# Patient Record
Sex: FEMALE | Race: WHITE | NOT HISPANIC OR LATINO | Employment: PART TIME | URBAN - METROPOLITAN AREA
[De-identification: names, ages, dates, MRNs, and addresses within clinical notes are randomized per-mention and may not be internally consistent; named-entity substitution may affect disease eponyms.]

---
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
## 2021-12-01 ENCOUNTER — ANNUAL EXAM (OUTPATIENT)
Dept: OBGYN CLINIC | Facility: CLINIC | Age: 21
End: 2021-12-01
Payer: COMMERCIAL

## 2021-12-01 VITALS
BODY MASS INDEX: 22.2 KG/M2 | WEIGHT: 130 LBS | DIASTOLIC BLOOD PRESSURE: 70 MMHG | SYSTOLIC BLOOD PRESSURE: 112 MMHG | HEIGHT: 64 IN

## 2021-12-01 DIAGNOSIS — Z30.011 ENCOUNTER FOR INITIAL PRESCRIPTION OF CONTRACEPTIVE PILLS: ICD-10-CM

## 2021-12-01 DIAGNOSIS — Z11.3 SCREENING EXAMINATION FOR VENEREAL DISEASE: ICD-10-CM

## 2021-12-01 DIAGNOSIS — Z01.419 WOMEN'S ANNUAL ROUTINE GYNECOLOGICAL EXAMINATION: Primary | ICD-10-CM

## 2021-12-01 DIAGNOSIS — Z83.49 FAMILY HISTORY OF GRAVES' DISEASE: ICD-10-CM

## 2021-12-01 DIAGNOSIS — Z11.3 ENCOUNTER FOR SPECIAL SCREENING EXAMINATION FOR INFECTION WITH PREDOMINANTLY SEXUAL MODE OF TRANSMISSION: ICD-10-CM

## 2021-12-01 PROCEDURE — 99395 PREV VISIT EST AGE 18-39: CPT | Performed by: OBSTETRICS & GYNECOLOGY

## 2021-12-01 PROCEDURE — 87491 CHLMYD TRACH DNA AMP PROBE: CPT | Performed by: OBSTETRICS & GYNECOLOGY

## 2021-12-01 PROCEDURE — 0503F POSTPARTUM CARE VISIT: CPT | Performed by: OBSTETRICS & GYNECOLOGY

## 2021-12-01 PROCEDURE — 87591 N.GONORRHOEAE DNA AMP PROB: CPT | Performed by: OBSTETRICS & GYNECOLOGY

## 2021-12-01 RX ORDER — NORETHINDRONE ACETATE AND ETHINYL ESTRADIOL 1MG-20(21)
1 KIT ORAL DAILY
Qty: 84 TABLET | Refills: 3 | Status: SHIPPED | OUTPATIENT
Start: 2021-12-01

## 2021-12-03 ENCOUNTER — APPOINTMENT (OUTPATIENT)
Dept: LAB | Facility: MEDICAL CENTER | Age: 21
End: 2021-12-03
Payer: COMMERCIAL

## 2021-12-03 DIAGNOSIS — Z83.49 FAMILY HISTORY OF GRAVES' DISEASE: ICD-10-CM

## 2021-12-03 LAB
C TRACH DNA SPEC QL NAA+PROBE: NEGATIVE
N GONORRHOEA DNA SPEC QL NAA+PROBE: NEGATIVE
T4 FREE SERPL-MCNC: 1.11 NG/DL (ref 0.78–1.33)
TSH SERPL DL<=0.05 MIU/L-ACNC: 0.42 UIU/ML (ref 0.46–3.98)

## 2021-12-03 PROCEDURE — 84439 ASSAY OF FREE THYROXINE: CPT

## 2021-12-03 PROCEDURE — 36415 COLL VENOUS BLD VENIPUNCTURE: CPT

## 2021-12-03 PROCEDURE — 84443 ASSAY THYROID STIM HORMONE: CPT

## 2022-01-05 ENCOUNTER — HOSPITAL ENCOUNTER (EMERGENCY)
Facility: HOSPITAL | Age: 22
Discharge: HOME | End: 2022-01-05
Attending: EMERGENCY MEDICINE | Admitting: EMERGENCY MEDICINE
Payer: COMMERCIAL

## 2022-01-05 ENCOUNTER — APPOINTMENT (EMERGENCY)
Dept: RADIOLOGY | Facility: HOSPITAL | Age: 22
End: 2022-01-05
Payer: COMMERCIAL

## 2022-01-05 VITALS
HEART RATE: 83 BPM | OXYGEN SATURATION: 98 % | DIASTOLIC BLOOD PRESSURE: 90 MMHG | RESPIRATION RATE: 20 BRPM | TEMPERATURE: 97.8 F | SYSTOLIC BLOOD PRESSURE: 145 MMHG

## 2022-01-05 DIAGNOSIS — R68.89 FLU-LIKE SYMPTOMS: Primary | ICD-10-CM

## 2022-01-05 DIAGNOSIS — Z20.822 SUSPECTED COVID-19 VIRUS INFECTION: ICD-10-CM

## 2022-01-05 PROCEDURE — 99283 EMERGENCY DEPT VISIT LOW MDM: CPT

## 2022-01-05 PROCEDURE — 71046 X-RAY EXAM CHEST 2 VIEWS: CPT

## 2022-01-05 PROCEDURE — 99284 EMERGENCY DEPT VISIT MOD MDM: CPT | Performed by: SURGERY

## 2022-01-05 PROCEDURE — 87636 SARSCOV2 & INF A&B AMP PRB: CPT | Performed by: SURGERY

## 2022-01-05 RX ORDER — AZITHROMYCIN 250 MG/1
TABLET, FILM COATED ORAL
Qty: 6 TABLET | Refills: 0 | Status: SHIPPED | OUTPATIENT
Start: 2022-01-05 | End: 2022-01-09

## 2022-01-05 RX ORDER — ALBUTEROL SULFATE 90 UG/1
2 AEROSOL, METERED RESPIRATORY (INHALATION) ONCE
Status: COMPLETED | OUTPATIENT
Start: 2022-01-05 | End: 2022-01-05

## 2022-01-05 RX ADMIN — ALBUTEROL SULFATE 2 PUFF: 90 AEROSOL, METERED RESPIRATORY (INHALATION) at 05:42

## 2022-01-05 NOTE — Clinical Note
Ashley Carias was seen and treated in our emergency department on 1/5/2022  No work    Diagnosis: Suspected Cristiano Lawrence  may return to work on return date  She may return on this date: 01/12/2022         If you have any questions or concerns, please don't hesitate to call        Wilfrid Robbins PA-C    ______________________________           _______________          _______________  Hospital Representative                              Date                                Time

## 2022-01-05 NOTE — DISCHARGE INSTRUCTIONS
Please return to the ED if you begin to experience any new or worsening symptoms, chest pain, shortness of breath, lightheadedness, dizziness, changes to vision, syncopal episodes, numbness, tingling, or weakness in the extremities, difficulty walking/swallowing/breathing

## 2022-01-09 LAB
FLUAV RNA RESP QL NAA+PROBE: NEGATIVE
FLUBV RNA RESP QL NAA+PROBE: NEGATIVE
SARS-COV-2 RNA RESP QL NAA+PROBE: NEGATIVE

## 2022-01-14 ENCOUNTER — OFFICE VISIT (OUTPATIENT)
Dept: URGENT CARE | Facility: MEDICAL CENTER | Age: 22
End: 2022-01-14
Payer: COMMERCIAL

## 2022-01-14 VITALS
TEMPERATURE: 98.4 F | WEIGHT: 121 LBS | OXYGEN SATURATION: 97 % | BODY MASS INDEX: 20.66 KG/M2 | HEIGHT: 64 IN | HEART RATE: 94 BPM | SYSTOLIC BLOOD PRESSURE: 147 MMHG | DIASTOLIC BLOOD PRESSURE: 75 MMHG | RESPIRATION RATE: 18 BRPM

## 2022-01-14 DIAGNOSIS — I47.9 TACHYCARDIA, PAROXYSMAL (HCC): Primary | ICD-10-CM

## 2022-01-14 PROCEDURE — 99213 OFFICE O/P EST LOW 20 MIN: CPT | Performed by: PHYSICIAN ASSISTANT

## 2022-01-14 PROCEDURE — 93005 ELECTROCARDIOGRAM TRACING: CPT | Performed by: PHYSICIAN ASSISTANT

## 2022-01-14 RX ORDER — METHYLPREDNISOLONE 4 MG/1
TABLET ORAL
COMMUNITY
Start: 2022-01-10 | End: 2022-04-15

## 2022-01-14 RX ORDER — AZITHROMYCIN 250 MG/1
TABLET, FILM COATED ORAL
COMMUNITY
Start: 2022-01-10 | End: 2022-04-15

## 2022-01-14 NOTE — PATIENT INSTRUCTIONS
1  Increase her oral hydration  2  Get better rest     3   Go to the ER for any worsening symptoms  4  Follow-up with your PCP next week

## 2022-01-14 NOTE — ED PROVIDER NOTES
HPI: Patient is a 22 y o  female who presents with 4-5 days of fever, chills, cough and myalgias which the patient describes at mild The patient has had contact with people with similar symptoms  The patient taken OTC medication with relief of symptoms  No Known Allergies    History reviewed  No pertinent past medical history  History reviewed  No pertinent surgical history  Social History     Tobacco Use    Smoking status: Former Smoker     Types: Cigarettes    Smokeless tobacco: Never Used    Tobacco comment: quit 2-3 years   Vaping Use    Vaping Use: Every day    Substances: Nicotine, Flavoring   Substance Use Topics    Alcohol use: Not Currently     Comment: rarely    Drug use: Yes     Types: Marijuana       Nursing notes reviewed  Physical Exam:  ED Triage Vitals [01/05/22 0522]   Temperature Pulse Respirations Blood Pressure SpO2   97 8 °F (36 6 °C) 83 20 145/90 98 %      Temp Source Heart Rate Source Patient Position - Orthostatic VS BP Location FiO2 (%)   Temporal Monitor Sitting Left arm --      Pain Score       --           ROS: Positive for chills, myalgias, fever, sore throat, headache the remainder of a 10 organ system ROS was otherwise unremarkable  General: awake, alert, no acute distress    Head: normocephalic, atraumatic    Eyes: no scleral icterus  Ears: external ears normal, hearing grossly intact  Nose: external exam grossly normal, positive nasal discharge  Neck: symmetric, No JVD noted, trachea midline  Pulmonary: no respiratory distress, no tachypnea noted  Cardiovascular: appears well perfused  Abdomen: no distention noted  Musculoskeletal: no deformities noted, tone normal  Neuro: grossly non-focal  Psych: mood and affect appropriate    The patient is stable and has a history and physical exam consistent with a viral illness  COVID19 testing has been performed  I considered the patient's other medical conditions as applicable/noted above in my medical decision making    The patient is stable upon discharge  The plan is for supportive care at home  The patient (and any family present) verbalized understanding of the discharge instructions and warnings that would necessitate return to the Emergency Department  All questions were answered prior to discharge  Medications   albuterol (PROVENTIL HFA,VENTOLIN HFA) inhaler 2 puff (2 puffs Inhalation Given 1/5/22 0542)     Final diagnoses:   Flu-like symptoms   Suspected COVID-19 virus infection     Time reflects when diagnosis was documented in both MDM as applicable and the Disposition within this note     Time User Action Codes Description Comment    1/5/2022  6:13 AM Dollene Cashing Add [R68 89] Flu-like symptoms     1/5/2022  6:13 AM Dollene Cashing Add [Z20 822] Suspected COVID-19 virus infection       ED Disposition     ED Disposition Condition Date/Time Comment    Discharge Stable Wed Jan 5, 2022  6:15 AM Aldo Ledesma discharge to home/self care  Follow-up Information     Follow up With Specialties Details Why Contact Info Additional [facility] Emergency Department Emergency Medicine Go to  If symptoms worsen [address] Emergency Department, [address] [place] [address], [facility] Niya Hernandez MD Pediatrics Schedule an appointment as soon as possible for a visit   [address] [place] [address] 16  [phone]           Discharge Medication List as of 1/5/2022  6:15 AM      CONTINUE these medications which have NOT CHANGED    Details   norethindrone-ethinyl estradiol (Junel FE 1/20) 1-20 MG-MCG per tablet Take 1 tablet by mouth daily, Starting Wed 12/1/2021, Normal           No discharge procedures on file      Electronically Signed by       Luis Hammond PA-C  01/14/22 9282

## 2022-01-14 NOTE — PROGRESS NOTES
[address] [facility]        NAME: Merrill Cordoba is a 22 y o  female  : 2000    MRN: [id number]  DATE: 2022  TIME: 1:22 PM    Assessment and Plan   Tachycardia, paroxysmal ([address] ) [I47 9]  1  Tachycardia, paroxysmal [facility])           Patient Instructions       Follow up with PCP in 3-5 days  Proceed to  ER if symptoms worsen  Chief Complaint     Chief Complaint   Patient presents with    Rapid Heart Rate     pt noticed that her heart rate has been elevated since monday  pt stated even with minimal activity   seen by pcp and they told her it could have been from congestion   placed on z pac and abx  pt stated that heart rate was above 100  History of Present Illness       52-year-old female with a 2 to three-day complaint of intermittent tachycardia  She denies irregularity in her heart rate that she can detect  She denies chest pain shortness of breath  She states that her sleep has been somewhat disrupted  She also states that symptoms seemed to begin after she began prednisone for bronchitis prescribed by her PcP this past Monday  She is currently asymptomatic  No syncope no lightheadedness no vertigo  She denies heavy caffeine intake  Rapid Heart Rate   Pertinent negatives include no chest pain, coughing, fever, shortness of breath or vomiting  Review of Systems   Review of Systems   Constitutional: Negative for chills, fatigue and fever  HENT: Negative for ear pain and sore throat  Eyes: Negative for pain and visual disturbance  Respiratory: Negative for cough, chest tightness and shortness of breath  Cardiovascular: Positive for palpitations  Negative for chest pain  Gastrointestinal: Negative for abdominal pain and vomiting  Genitourinary: Negative for dysuria and hematuria  Musculoskeletal: Negative for arthralgias and back pain  Skin: Negative for color change and rash  Neurological: Negative for seizures and syncope     All other systems reviewed and are negative  Current Medications       Current Outpatient Medications:     azithromycin (ZITHROMAX) 250 mg tablet, , Disp: , Rfl:     methylPREDNISolone 4 MG tablet therapy pack, , Disp: , Rfl:     norethindrone-ethinyl estradiol (Junel FE 1/20) 1-20 MG-MCG per tablet, Take 1 tablet by mouth daily, Disp: 84 tablet, Rfl: 3    Current Allergies     Allergies as of 01/14/2022    (No Known Allergies)            The following portions of the patient's history were reviewed and updated as appropriate: allergies, current medications, past family history, past medical history, past social history, past surgical history and problem list      No past medical history on file  No past surgical history on file  Family History   Problem Relation Age of Onset   Yocasta Dunnings' disease Mother     Hypertension Father          Medications have been verified  Objective   /75   Pulse 94   Temp 98 4 °F (36 9 °C)   Resp 18   Ht 5' 4" (1 626 m)   Wt 54 9 kg (121 lb)   SpO2 97%   BMI 20 77 kg/m²        Physical Exam     Physical Exam  Constitutional:       Appearance: Normal appearance  HENT:      Head: Normocephalic  Nose: Nose normal    Eyes:      Conjunctiva/sclera: Conjunctivae normal       Pupils: Pupils are equal, round, and reactive to light  Cardiovascular:      Rate and Rhythm: Normal rate and regular rhythm  Pulses: Normal pulses  Heart sounds: Normal heart sounds  No murmur heard  No friction rub  No gallop  Pulmonary:      Effort: Pulmonary effort is normal       Breath sounds: Normal breath sounds  Musculoskeletal:      Cervical back: Normal range of motion  Neurological:      Mental Status: She is alert  EKG interpretation:   Normal sinus rhythm with occasional conducted PACs present  Rate is 68 with no acute ST or T-wave changes      Medical decision making note:   Based on patient having bronchitis by diagnosis and being on prednisone I suspect that this is least in part because of her intermittent tachycardia  Patient knows that this is likely transient but for any persistent symptoms she should see her PCP and to go to the ER for any worsening symptoms

## 2022-01-15 LAB
ATRIAL RATE: 68 BPM
ATRIAL RATE: 77 BPM
ATRIAL RATE: 79 BPM
P AXIS: 56 DEGREES
P AXIS: 56 DEGREES
PR INTERVAL: 112 MS
PR INTERVAL: 82 MS
QRS AXIS: 18 DEGREES
QRS AXIS: 21 DEGREES
QRS AXIS: 32 DEGREES
QRSD INTERVAL: 80 MS
QRSD INTERVAL: 88 MS
QRSD INTERVAL: 88 MS
QT INTERVAL: 374 MS
QT INTERVAL: 380 MS
QT INTERVAL: 390 MS
QTC INTERVAL: 414 MS
QTC INTERVAL: 423 MS
QTC INTERVAL: 430 MS
T WAVE AXIS: 44 DEGREES
T WAVE AXIS: 45 DEGREES
T WAVE AXIS: 52 DEGREES
VENTRICULAR RATE: 68 BPM
VENTRICULAR RATE: 77 BPM
VENTRICULAR RATE: 77 BPM

## 2022-01-15 PROCEDURE — 93010 ELECTROCARDIOGRAM REPORT: CPT | Performed by: INTERNAL MEDICINE

## 2022-03-05 ENCOUNTER — APPOINTMENT (OUTPATIENT)
Dept: LAB | Facility: MEDICAL CENTER | Age: 22
End: 2022-03-05
Payer: COMMERCIAL

## 2022-03-05 DIAGNOSIS — E03.9 HYPOTHYROIDISM, UNSPECIFIED TYPE: ICD-10-CM

## 2022-03-05 DIAGNOSIS — D50.9 IRON DEFICIENCY ANEMIA, UNSPECIFIED IRON DEFICIENCY ANEMIA TYPE: ICD-10-CM

## 2022-03-05 DIAGNOSIS — R04.0 EPISTAXIS: ICD-10-CM

## 2022-03-05 LAB
APTT PPP: 29 SECONDS (ref 23–37)
BASOPHILS # BLD AUTO: 0.03 THOUSANDS/ΜL (ref 0–0.1)
BASOPHILS NFR BLD AUTO: 1 % (ref 0–1)
EOSINOPHIL # BLD AUTO: 0 THOUSAND/ΜL (ref 0–0.61)
EOSINOPHIL NFR BLD AUTO: 0 % (ref 0–6)
ERYTHROCYTE [DISTWIDTH] IN BLOOD BY AUTOMATED COUNT: 11.8 % (ref 11.6–15.1)
HCT VFR BLD AUTO: 41.4 % (ref 34.8–46.1)
HGB BLD-MCNC: 14.1 G/DL (ref 11.5–15.4)
IMM GRANULOCYTES # BLD AUTO: 0.02 THOUSAND/UL (ref 0–0.2)
IMM GRANULOCYTES NFR BLD AUTO: 0 % (ref 0–2)
INR PPP: 1.01 (ref 0.84–1.19)
LYMPHOCYTES # BLD AUTO: 1.45 THOUSANDS/ΜL (ref 0.6–4.47)
LYMPHOCYTES NFR BLD AUTO: 24 % (ref 14–44)
MCH RBC QN AUTO: 32.6 PG (ref 26.8–34.3)
MCHC RBC AUTO-ENTMCNC: 34.1 G/DL (ref 31.4–37.4)
MCV RBC AUTO: 96 FL (ref 82–98)
MONOCYTES # BLD AUTO: 0.39 THOUSAND/ΜL (ref 0.17–1.22)
MONOCYTES NFR BLD AUTO: 7 % (ref 4–12)
NEUTROPHILS # BLD AUTO: 4.12 THOUSANDS/ΜL (ref 1.85–7.62)
NEUTS SEG NFR BLD AUTO: 68 % (ref 43–75)
NRBC BLD AUTO-RTO: 0 /100 WBCS
PLATELET # BLD AUTO: 272 THOUSANDS/UL (ref 149–390)
PMV BLD AUTO: 10.4 FL (ref 8.9–12.7)
PROTHROMBIN TIME: 12.9 SECONDS (ref 11.6–14.5)
RBC # BLD AUTO: 4.32 MILLION/UL (ref 3.81–5.12)
T4 SERPL-MCNC: 12.9 UG/DL (ref 4.7–13.3)
TSH SERPL DL<=0.05 MIU/L-ACNC: 1.12 UIU/ML (ref 0.46–3.98)
WBC # BLD AUTO: 6.01 THOUSAND/UL (ref 4.31–10.16)

## 2022-03-05 PROCEDURE — 84436 ASSAY OF TOTAL THYROXINE: CPT

## 2022-03-05 PROCEDURE — 85610 PROTHROMBIN TIME: CPT

## 2022-03-05 PROCEDURE — 85025 COMPLETE CBC W/AUTO DIFF WBC: CPT

## 2022-03-05 PROCEDURE — 83020 HEMOGLOBIN ELECTROPHORESIS: CPT

## 2022-03-05 PROCEDURE — 84443 ASSAY THYROID STIM HORMONE: CPT

## 2022-03-05 PROCEDURE — 36415 COLL VENOUS BLD VENIPUNCTURE: CPT

## 2022-03-05 PROCEDURE — 85730 THROMBOPLASTIN TIME PARTIAL: CPT

## 2022-03-09 LAB
HGB A MFR BLD: 2.8 % (ref 1.8–3.2)
HGB A MFR BLD: 97.2 % (ref 96.4–98.8)
HGB F MFR BLD: 0 % (ref 0–2)
HGB FRACT BLD-IMP: NORMAL
HGB S MFR BLD: 0 %

## 2022-04-15 ENCOUNTER — APPOINTMENT (EMERGENCY)
Dept: CT IMAGING | Facility: HOSPITAL | Age: 22
End: 2022-04-15
Payer: COMMERCIAL

## 2022-04-15 ENCOUNTER — APPOINTMENT (EMERGENCY)
Dept: RADIOLOGY | Facility: HOSPITAL | Age: 22
End: 2022-04-15
Payer: COMMERCIAL

## 2022-04-15 ENCOUNTER — HOSPITAL ENCOUNTER (EMERGENCY)
Facility: HOSPITAL | Age: 22
Discharge: HOME | End: 2022-04-15
Attending: EMERGENCY MEDICINE
Payer: COMMERCIAL

## 2022-04-15 VITALS
SYSTOLIC BLOOD PRESSURE: 118 MMHG | HEIGHT: 64 IN | TEMPERATURE: 98 F | RESPIRATION RATE: 16 BRPM | BODY MASS INDEX: 22.43 KG/M2 | OXYGEN SATURATION: 93 % | HEART RATE: 90 BPM | DIASTOLIC BLOOD PRESSURE: 74 MMHG | WEIGHT: 131.39 LBS

## 2022-04-15 DIAGNOSIS — R07.89 CHEST WALL PAIN: ICD-10-CM

## 2022-04-15 DIAGNOSIS — R10.9 ABDOMINAL PAIN: Primary | ICD-10-CM

## 2022-04-15 LAB
ALBUMIN SERPL BCP-MCNC: 3.9 G/DL (ref 3.5–5)
ALP SERPL-CCNC: 49 U/L (ref 46–116)
ALT SERPL W P-5'-P-CCNC: 29 U/L (ref 12–78)
ANION GAP SERPL CALCULATED.3IONS-SCNC: 12 MMOL/L (ref 4–13)
AST SERPL W P-5'-P-CCNC: 16 U/L (ref 5–45)
BACTERIA UR QL AUTO: ABNORMAL /HPF
BASOPHILS # BLD AUTO: 0.03 THOUSANDS/ΜL (ref 0–0.1)
BASOPHILS NFR BLD AUTO: 0 % (ref 0–1)
BILIRUB SERPL-MCNC: 0.53 MG/DL (ref 0.2–1)
BILIRUB UR QL STRIP: NEGATIVE
BUN SERPL-MCNC: 10 MG/DL (ref 5–25)
CALCIUM SERPL-MCNC: 9.1 MG/DL (ref 8.3–10.1)
CHLORIDE SERPL-SCNC: 103 MMOL/L (ref 100–108)
CLARITY UR: ABNORMAL
CO2 SERPL-SCNC: 24 MMOL/L (ref 21–32)
COLOR UR: YELLOW
CREAT SERPL-MCNC: 0.66 MG/DL (ref 0.6–1.3)
EOSINOPHIL # BLD AUTO: 0 THOUSAND/ΜL (ref 0–0.61)
EOSINOPHIL NFR BLD AUTO: 0 % (ref 0–6)
ERYTHROCYTE [DISTWIDTH] IN BLOOD BY AUTOMATED COUNT: 11.8 % (ref 11.6–15.1)
EXT PREG TEST URINE: NEGATIVE
EXT. CONTROL ED NAV: NORMAL
GFR SERPL CREATININE-BSD FRML MDRD: 127 ML/MIN/1.73SQ M
GLUCOSE SERPL-MCNC: 81 MG/DL (ref 65–140)
GLUCOSE UR STRIP-MCNC: NEGATIVE MG/DL
HCT VFR BLD AUTO: 40.5 % (ref 34.8–46.1)
HGB BLD-MCNC: 14.1 G/DL (ref 11.5–15.4)
HGB UR QL STRIP.AUTO: NEGATIVE
IMM GRANULOCYTES # BLD AUTO: 0.07 THOUSAND/UL (ref 0–0.2)
IMM GRANULOCYTES NFR BLD AUTO: 1 % (ref 0–2)
KETONES UR STRIP-MCNC: NEGATIVE MG/DL
LEUKOCYTE ESTERASE UR QL STRIP: ABNORMAL
LIPASE SERPL-CCNC: 58 U/L (ref 73–393)
LYMPHOCYTES # BLD AUTO: 1.21 THOUSANDS/ΜL (ref 0.6–4.47)
LYMPHOCYTES NFR BLD AUTO: 12 % (ref 14–44)
MCH RBC QN AUTO: 33.4 PG (ref 26.8–34.3)
MCHC RBC AUTO-ENTMCNC: 34.8 G/DL (ref 31.4–37.4)
MCV RBC AUTO: 96 FL (ref 82–98)
MONOCYTES # BLD AUTO: 0.41 THOUSAND/ΜL (ref 0.17–1.22)
MONOCYTES NFR BLD AUTO: 4 % (ref 4–12)
NEUTROPHILS # BLD AUTO: 8.73 THOUSANDS/ΜL (ref 1.85–7.62)
NEUTS SEG NFR BLD AUTO: 83 % (ref 43–75)
NITRITE UR QL STRIP: NEGATIVE
NON-SQ EPI CELLS URNS QL MICRO: ABNORMAL /HPF
NRBC BLD AUTO-RTO: 0 /100 WBCS
PH UR STRIP.AUTO: 7 [PH] (ref 4.5–8)
PLATELET # BLD AUTO: 276 THOUSANDS/UL (ref 149–390)
PMV BLD AUTO: 9.7 FL (ref 8.9–12.7)
POTASSIUM SERPL-SCNC: 4.3 MMOL/L (ref 3.5–5.3)
PROT SERPL-MCNC: 7.6 G/DL (ref 6.4–8.2)
PROT UR STRIP-MCNC: NEGATIVE MG/DL
RBC # BLD AUTO: 4.22 MILLION/UL (ref 3.81–5.12)
RBC #/AREA URNS AUTO: ABNORMAL /HPF
SODIUM SERPL-SCNC: 139 MMOL/L (ref 136–145)
SP GR UR STRIP.AUTO: 1.01 (ref 1–1.03)
UROBILINOGEN UR QL STRIP.AUTO: 0.2 E.U./DL
WBC # BLD AUTO: 10.45 THOUSAND/UL (ref 4.31–10.16)
WBC #/AREA URNS AUTO: ABNORMAL /HPF

## 2022-04-15 PROCEDURE — 87086 URINE CULTURE/COLONY COUNT: CPT

## 2022-04-15 PROCEDURE — 85025 COMPLETE CBC W/AUTO DIFF WBC: CPT | Performed by: PHYSICIAN ASSISTANT

## 2022-04-15 PROCEDURE — G1004 CDSM NDSC: HCPCS

## 2022-04-15 PROCEDURE — 74177 CT ABD & PELVIS W/CONTRAST: CPT

## 2022-04-15 PROCEDURE — 96360 HYDRATION IV INFUSION INIT: CPT

## 2022-04-15 PROCEDURE — 36415 COLL VENOUS BLD VENIPUNCTURE: CPT | Performed by: PHYSICIAN ASSISTANT

## 2022-04-15 PROCEDURE — 81001 URINALYSIS AUTO W/SCOPE: CPT

## 2022-04-15 PROCEDURE — 80053 COMPREHEN METABOLIC PANEL: CPT | Performed by: PHYSICIAN ASSISTANT

## 2022-04-15 PROCEDURE — 83690 ASSAY OF LIPASE: CPT | Performed by: PHYSICIAN ASSISTANT

## 2022-04-15 PROCEDURE — 81025 URINE PREGNANCY TEST: CPT | Performed by: PHYSICIAN ASSISTANT

## 2022-04-15 PROCEDURE — 93005 ELECTROCARDIOGRAM TRACING: CPT

## 2022-04-15 PROCEDURE — 99285 EMERGENCY DEPT VISIT HI MDM: CPT | Performed by: PHYSICIAN ASSISTANT

## 2022-04-15 PROCEDURE — 99285 EMERGENCY DEPT VISIT HI MDM: CPT

## 2022-04-15 PROCEDURE — 96361 HYDRATE IV INFUSION ADD-ON: CPT

## 2022-04-15 PROCEDURE — 71045 X-RAY EXAM CHEST 1 VIEW: CPT

## 2022-04-15 RX ORDER — ACETAMINOPHEN 325 MG/1
650 TABLET ORAL ONCE
Status: COMPLETED | OUTPATIENT
Start: 2022-04-15 | End: 2022-04-15

## 2022-04-15 RX ADMIN — IOHEXOL 100 ML: 350 INJECTION, SOLUTION INTRAVENOUS at 12:09

## 2022-04-15 RX ADMIN — ACETAMINOPHEN 650 MG: 325 TABLET ORAL at 11:07

## 2022-04-15 RX ADMIN — SODIUM CHLORIDE 1000 ML: 0.9 INJECTION, SOLUTION INTRAVENOUS at 11:13

## 2022-04-15 NOTE — ED PROVIDER NOTES
History  Chief Complaint   Patient presents with    Abdominal Pain     pt complains of abdominal pain started this morning  dx with mono 1-31-22, called PCP was told to come to ER     The patient is a 26-year-old female with no significant past medical history who presents to the emergency department for evaluation of abdominal pain  The patient states that she was diagnosed with mono back in January  She has been monitored by her primary care doctor for splenomegaly that was found on exam since  She states today, she developed pain in her left upper quadrant  She called her doctor, and she was advised to come to the emergency department for further evaluation  She denies any recent injuries  She also reports that she has had some pain in her right upper quadrant as well as some pain up in her right axillary area  She did not take anything for pain prior to coming in today  There is no associated nausea or vomiting currently, although she states that she has been experiencing some nausea in the evenings  She denies any chance of pregnancy if she is not currently sexually active  She is not on any blood thinners  She additionally denies fever, chills or further complaints at this time  History provided by:  Patient   used: No    Abdominal Pain  Associated symptoms: nausea    Associated symptoms: no chest pain, no chills, no cough, no diarrhea, no dysuria, no fever, no hematuria, no shortness of breath, no sore throat and no vomiting        Prior to Admission Medications   Prescriptions Last Dose Informant Patient Reported? Taking?   norethindrone-ethinyl estradiol (Junel FE 1/20) 1-20 MG-MCG per tablet   No Yes   Sig: Take 1 tablet by mouth daily      Facility-Administered Medications: None       Past Medical History:   Diagnosis Date    Mononucleosis        History reviewed  No pertinent surgical history      Family History   Problem Relation Age of Onset   Symone Elder' disease Mother     Hypertension Father      I have reviewed and agree with the history as documented  E-Cigarette/Vaping    E-Cigarette Use Current Every Day User      E-Cigarette/Vaping Substances    Nicotine Yes     THC No     CBD No     Flavoring Yes     Other No     Unknown No      Social History     Tobacco Use    Smoking status: Former Smoker     Types: Cigarettes    Smokeless tobacco: Never Used    Tobacco comment: quit 2-3 years   Vaping Use    Vaping Use: Every day    Substances: Nicotine, Flavoring   Substance Use Topics    Alcohol use: Not Currently     Comment: rarely    Drug use: Not Currently     Types: Marijuana       Review of Systems   Constitutional: Negative for chills and fever  HENT: Negative for ear pain and sore throat  Eyes: Negative for redness and visual disturbance  Respiratory: Negative for cough and shortness of breath  Cardiovascular: Negative for chest pain  Gastrointestinal: Positive for abdominal pain and nausea  Negative for diarrhea and vomiting  Genitourinary: Negative for dysuria and hematuria  Musculoskeletal: Negative for back pain, neck pain and neck stiffness  Skin: Negative for color change and rash  Neurological: Negative for dizziness, light-headedness and headaches  All other systems reviewed and are negative  Physical Exam  Physical Exam  Vitals and nursing note reviewed  Constitutional:       General: She is not in acute distress  Appearance: She is well-developed  She is not ill-appearing or toxic-appearing  HENT:      Head: Normocephalic and atraumatic  Mouth/Throat:      Pharynx: Uvula midline  Eyes:      General: Lids are normal       Conjunctiva/sclera: Conjunctivae normal    Cardiovascular:      Rate and Rhythm: Normal rate and regular rhythm  Heart sounds: Normal heart sounds  Pulmonary:      Effort: Pulmonary effort is normal       Breath sounds: Normal breath sounds     Abdominal:      General: There is no distension  Palpations: Abdomen is soft  Tenderness: There is abdominal tenderness in the right upper quadrant and left upper quadrant  Musculoskeletal:      Cervical back: Normal range of motion and neck supple  Skin:     General: Skin is warm and dry  Neurological:      Mental Status: She is alert and oriented to person, place, and time  Vital Signs  ED Triage Vitals   Temperature Pulse Respirations Blood Pressure SpO2   04/15/22 1041 04/15/22 1041 04/15/22 1041 04/15/22 1041 04/15/22 1041   98 °F (36 7 °C) 89 16 141/86 98 %      Temp Source Heart Rate Source Patient Position - Orthostatic VS BP Location FiO2 (%)   04/15/22 1041 -- -- -- --   Oral          Pain Score       04/15/22 1107       1           Vitals:    04/15/22 1041 04/15/22 1315   BP: 141/86 118/74   Pulse: 89 90         Visual Acuity      ED Medications  Medications   sodium chloride 0 9 % bolus 1,000 mL (0 mL Intravenous Stopped 4/15/22 1314)   acetaminophen (TYLENOL) tablet 650 mg (650 mg Oral Given 4/15/22 1107)   iohexol (OMNIPAQUE) 350 MG/ML injection (SINGLE-DOSE) 100 mL (100 mL Intravenous Given 4/15/22 1209)       Diagnostic Studies  Results Reviewed     Procedure Component Value Units Date/Time    Urine Microscopic [[id number]]  (Abnormal) Collected: 04/15/22 1136    Lab Status: Final result Specimen: Urine, Clean Catch Updated: 04/15/22 1253     RBC, UA 0-1 /hpf      WBC, UA 20-30 /hpf      Epithelial Cells Moderate /hpf      Bacteria, UA Moderate /hpf     Urine culture [[id number]] Collected: 04/15/22 1136    Lab Status:  In process Specimen: Urine, Clean Catch Updated: 04/15/22 1253    Comprehensive metabolic panel [[id number]] Collected: 04/15/22 1111    Lab Status: Final result Specimen: Blood from Arm, Right Updated: 04/15/22 1150     Sodium 139 mmol/L      Potassium 4 3 mmol/L      Chloride 103 mmol/L      CO2 24 mmol/L      ANION GAP 12 mmol/L      BUN 10 mg/dL      Creatinine 0 66 mg/dL      Glucose 81 mg/dL Calcium 9 1 mg/dL      AST 16 U/L      ALT 29 U/L      Alkaline Phosphatase 49 U/L      Total Protein 7 6 g/dL      Albumin 3 9 g/dL      Total Bilirubin 0 53 mg/dL      eGFR 127 ml/min/1 73sq m     Narrative:      [place] guidelines for Chronic Kidney Disease (CKD):     Stage 1 with normal or high GFR (GFR > 90 mL/min/1 73 square meters)    Stage 2 Mild CKD (GFR = 60-89 mL/min/1 73 square meters)    Stage 3A Moderate CKD (GFR = 45-59 mL/min/1 73 square meters)    Stage 3B Moderate CKD (GFR = 30-44 mL/min/1 73 square meters)    Stage 4 Severe CKD (GFR = 15-29 mL/min/1 73 square meters)    Stage 5 End Stage CKD (GFR <15 mL/min/1 73 square meters)  Note: GFR calculation is accurate only with a steady state creatinine    Lipase [[id number]]  (Abnormal) Collected: 04/15/22 1111    Lab Status: Final result Specimen: Blood from Arm, Right Updated: 04/15/22 1150     Lipase 58 u/L     POCT pregnancy, urine [[id number]]  (Normal) Resulted: 04/15/22 1140    Lab Status: Final result Updated: 04/15/22 1140     EXT PREG TEST UR (Ref: Negative) negative     Control valid    Urine Macroscopic, POC [[id number]]  (Abnormal) Collected: 04/15/22 1136    Lab Status: Final result Specimen: Urine Updated: 04/15/22 1138     Color, UA Yellow     Clarity, UA Cloudy     pH, UA 7 0     Leukocytes, UA Large     Nitrite, UA Negative     Protein, UA Negative mg/dl      Glucose, UA Negative mg/dl      Ketones, UA Negative mg/dl      Urobilinogen, UA 0 2 E U /dl      Bilirubin, UA Negative     Blood, UA Negative     Specific Gravity, UA 1 010    Narrative:      CLINITEK RESULT    CBC and differential [[id number]]  (Abnormal) Collected: 04/15/22 1111    Lab Status: Final result Specimen: Blood from Arm, Right Updated: 04/15/22 1129     WBC 10 45 Thousand/uL      RBC 4 22 Million/uL      Hemoglobin 14 1 g/dL      Hematocrit 40 5 %      MCV 96 fL      MCH 33 4 pg      MCHC 34 8 g/dL      RDW 11 8 %      MPV 9 7 fL Platelets 190 Thousands/uL      nRBC 0 /100 WBCs      Neutrophils Relative 83 %      Immat GRANS % 1 %      Lymphocytes Relative 12 %      Monocytes Relative 4 %      Eosinophils Relative 0 %      Basophils Relative 0 %      Neutrophils Absolute 8 73 Thousands/µL      Immature Grans Absolute 0 07 Thousand/uL      Lymphocytes Absolute 1 21 Thousands/µL      Monocytes Absolute 0 41 Thousand/µL      Eosinophils Absolute 0 00 Thousand/µL      Basophils Absolute 0 03 Thousands/µL                  CT abdomen pelvis with contrast   Final Result by Judd Kaiser MD (04/15 1236)      No acute inflammatory process identified in the abdomen or pelvis  Normal spleen size  Workstation performed: [id number]         XR chest 1 view portable   Final Result by Jeferson Sagastume MD (04/15 1222)      No acute cardiopulmonary disease  Findings are stable            Workstation performed: [id number]                    Procedures  ECG 12 Lead Documentation Only    Date/Time: 4/15/2022 4:19 PM  Performed by: Tima Oliver PA-C  Authorized by: Harini Jauregui PA-C     Comments:      Normal sinus rhythm at 80  Normal axis  No acute ST-T changes  No previous EKG available for comparison  ED Course  ED Course as of 04/15/22 1622   Fri Apr 15, 2022   1226 Updated patient on lab results  Patient is resting comfortably at this time  Pending CT results  1305 Discussed all results with patient  She is not having any urinary tract infection symptoms at this time  Will wait for culture results prior to deciding if patient requires antibiotics  MDM  Number of Diagnoses or Management Options  Abdominal pain: new and requires workup  Chest wall pain: new and requires workup  Diagnosis management comments: Patient presents for evaluation of upper abdominal pain that started today    Patient has been being monitored for splenomegaly by her primary care doctor after being found to have a mononucleosis infection earlier this year  Patient is also reporting some chest wall pain in the right axillary area  Differential includes but is not limited to gastritis versus pancreatitis versus PUD versus cholecystitis versus splenic rupture  Labs, imaging EKG were ordered  Tylenol was ordered for pain  Labs reviewed with no significant findings  CT of abdomen and pelvis does not show any acute findings  Spleen is noted to be normal sized  Incidental findings discussed with patient  Chest x-ray is without any acute findings  EKG is without acute ischemic change  All results were discussed with the patient  Unclear etiology for patient's pain at this time, possibly musculoskeletal   I advised trying a course of anti-inflammatory medications such as ibuprofen over the next couple of days to see if this helps the pain resolved  Patient was advised to follow-up with her primary care doctor further if it does not resolve  She was advised to return to the ED with any new or significantly worsening symptoms  Patient stable for discharge         Amount and/or Complexity of Data Reviewed  Clinical lab tests: ordered and reviewed  Tests in the radiology section of CPT®: reviewed and ordered  Decide to obtain previous medical records or to obtain history from someone other than the patient: yes  Review and summarize past medical records: yes    Risk of Complications, Morbidity, and/or Mortality  Presenting problems: low  Diagnostic procedures: low  Management options: low    Patient Progress  Patient progress: stable      Disposition  Final diagnoses:   Abdominal pain   Chest wall pain     Time reflects when diagnosis was documented in both MDM as applicable and the Disposition within this note     Time User Action Codes Description Comment    4/15/2022  1:06 PM Benjamin Balderas Add [R10 9] Abdominal pain     4/15/2022  1:06 PM Lore Villa, [employer] Add [R07 89] Chest wall pain       ED Disposition     ED Disposition Condition Date/Time Comment    Discharge Stable Fri Apr 15, 2022  1:06 PM Eric Evans discharge to home/self care  Follow-up Information     Follow up With Specialties Details Why Contact Info Additional Information    Rich Redmond MD Pediatrics Schedule an appointment as soon as possible for a visit in 2 days  [address] [facility]  [address] Emergency Department Emergency Medicine  If symptoms worsen [address] [facility] [address] [facility] Emergency Department, [address], [place], [place], [address]          Discharge Medication List as of 4/15/2022  1:07 PM      CONTINUE these medications which have NOT CHANGED    Details   norethindrone-ethinyl estradiol (Junel FE 1/20) 1-20 MG-MCG per tablet Take 1 tablet by mouth daily, Starting Wed 12/1/2021, Normal             No discharge procedures on file      PDMP Review     None          ED Provider  Electronically Signed by           Jeffory Nyhan, PA-C  04/15/22 3444

## 2022-04-16 LAB — BACTERIA UR CULT: NORMAL

## 2022-04-17 LAB
ATRIAL RATE: 83 BPM
P AXIS: 70 DEGREES
PR INTERVAL: 114 MS
QRS AXIS: 43 DEGREES
QRSD INTERVAL: 74 MS
QT INTERVAL: 350 MS
QTC INTERVAL: 404 MS
T WAVE AXIS: 56 DEGREES
VENTRICULAR RATE: 80 BPM

## 2022-04-17 PROCEDURE — 93010 ELECTROCARDIOGRAM REPORT: CPT | Performed by: INTERNAL MEDICINE

## 2022-04-20 ENCOUNTER — APPOINTMENT (EMERGENCY)
Dept: RADIOLOGY | Facility: HOSPITAL | Age: 22
End: 2022-04-20
Payer: COMMERCIAL

## 2022-04-20 ENCOUNTER — HOSPITAL ENCOUNTER (EMERGENCY)
Facility: HOSPITAL | Age: 22
Discharge: HOME | End: 2022-04-20
Attending: EMERGENCY MEDICINE | Admitting: EMERGENCY MEDICINE
Payer: COMMERCIAL

## 2022-04-20 VITALS
BODY MASS INDEX: 22.49 KG/M2 | SYSTOLIC BLOOD PRESSURE: 124 MMHG | OXYGEN SATURATION: 98 % | RESPIRATION RATE: 18 BRPM | DIASTOLIC BLOOD PRESSURE: 84 MMHG | HEART RATE: 88 BPM | WEIGHT: 131 LBS

## 2022-04-20 DIAGNOSIS — R10.9 BILATERAL FLANK PAIN: Primary | ICD-10-CM

## 2022-04-20 LAB
ALBUMIN SERPL BCP-MCNC: 4.3 G/DL (ref 3.5–5)
ALP SERPL-CCNC: 57 U/L (ref 46–116)
ALT SERPL W P-5'-P-CCNC: 33 U/L (ref 12–78)
ANION GAP SERPL CALCULATED.3IONS-SCNC: 11 MMOL/L (ref 4–13)
AST SERPL W P-5'-P-CCNC: 18 U/L (ref 5–45)
BACTERIA UR QL AUTO: ABNORMAL /HPF
BASOPHILS # BLD AUTO: 0.02 THOUSANDS/ΜL (ref 0–0.1)
BASOPHILS NFR BLD AUTO: 0 % (ref 0–1)
BILIRUB SERPL-MCNC: 0.41 MG/DL (ref 0.2–1)
BILIRUB UR QL STRIP: NEGATIVE
BUN SERPL-MCNC: 8 MG/DL (ref 5–25)
CALCIUM SERPL-MCNC: 9.4 MG/DL (ref 8.3–10.1)
CHLORIDE SERPL-SCNC: 105 MMOL/L (ref 100–108)
CLARITY UR: CLEAR
CO2 SERPL-SCNC: 26 MMOL/L (ref 21–32)
COLOR UR: YELLOW
CREAT SERPL-MCNC: 0.76 MG/DL (ref 0.6–1.3)
EOSINOPHIL # BLD AUTO: 0.01 THOUSAND/ΜL (ref 0–0.61)
EOSINOPHIL NFR BLD AUTO: 0 % (ref 0–6)
ERYTHROCYTE [DISTWIDTH] IN BLOOD BY AUTOMATED COUNT: 11.8 % (ref 11.6–15.1)
EXT PREG TEST URINE: NEGATIVE
EXT. CONTROL ED NAV: NORMAL
GFR SERPL CREATININE-BSD FRML MDRD: 113 ML/MIN/1.73SQ M
GLUCOSE SERPL-MCNC: 85 MG/DL (ref 65–140)
GLUCOSE UR STRIP-MCNC: NEGATIVE MG/DL
HCT VFR BLD AUTO: 43.7 % (ref 34.8–46.1)
HGB BLD-MCNC: 14.9 G/DL (ref 11.5–15.4)
HGB UR QL STRIP.AUTO: ABNORMAL
IMM GRANULOCYTES # BLD AUTO: 0.03 THOUSAND/UL (ref 0–0.2)
IMM GRANULOCYTES NFR BLD AUTO: 1 % (ref 0–2)
KETONES UR STRIP-MCNC: NEGATIVE MG/DL
LEUKOCYTE ESTERASE UR QL STRIP: NEGATIVE
LYMPHOCYTES # BLD AUTO: 1.56 THOUSANDS/ΜL (ref 0.6–4.47)
LYMPHOCYTES NFR BLD AUTO: 30 % (ref 14–44)
MCH RBC QN AUTO: 32.8 PG (ref 26.8–34.3)
MCHC RBC AUTO-ENTMCNC: 34.1 G/DL (ref 31.4–37.4)
MCV RBC AUTO: 96 FL (ref 82–98)
MONOCYTES # BLD AUTO: 0.37 THOUSAND/ΜL (ref 0.17–1.22)
MONOCYTES NFR BLD AUTO: 7 % (ref 4–12)
MUCOUS THREADS UR QL AUTO: ABNORMAL
NEUTROPHILS # BLD AUTO: 3.24 THOUSANDS/ΜL (ref 1.85–7.62)
NEUTS SEG NFR BLD AUTO: 62 % (ref 43–75)
NITRITE UR QL STRIP: NEGATIVE
NON-SQ EPI CELLS URNS QL MICRO: ABNORMAL /HPF
NRBC BLD AUTO-RTO: 0 /100 WBCS
PH UR STRIP.AUTO: 5.5 [PH] (ref 4.5–8)
PLATELET # BLD AUTO: 257 THOUSANDS/UL (ref 149–390)
PMV BLD AUTO: 10.3 FL (ref 8.9–12.7)
POTASSIUM SERPL-SCNC: 3.7 MMOL/L (ref 3.5–5.3)
PROT SERPL-MCNC: 8 G/DL (ref 6.4–8.2)
PROT UR STRIP-MCNC: NEGATIVE MG/DL
RBC # BLD AUTO: 4.54 MILLION/UL (ref 3.81–5.12)
RBC #/AREA URNS AUTO: ABNORMAL /HPF
SODIUM SERPL-SCNC: 142 MMOL/L (ref 136–145)
SP GR UR STRIP.AUTO: 1.02 (ref 1–1.03)
UROBILINOGEN UR QL STRIP.AUTO: 0.2 E.U./DL
WBC # BLD AUTO: 5.23 THOUSAND/UL (ref 4.31–10.16)
WBC #/AREA URNS AUTO: ABNORMAL /HPF

## 2022-04-20 PROCEDURE — 99285 EMERGENCY DEPT VISIT HI MDM: CPT | Performed by: EMERGENCY MEDICINE

## 2022-04-20 PROCEDURE — 85025 COMPLETE CBC W/AUTO DIFF WBC: CPT

## 2022-04-20 PROCEDURE — 99283 EMERGENCY DEPT VISIT LOW MDM: CPT

## 2022-04-20 PROCEDURE — 80053 COMPREHEN METABOLIC PANEL: CPT

## 2022-04-20 PROCEDURE — 36415 COLL VENOUS BLD VENIPUNCTURE: CPT

## 2022-04-20 PROCEDURE — 81025 URINE PREGNANCY TEST: CPT

## 2022-04-20 PROCEDURE — 96374 THER/PROPH/DIAG INJ IV PUSH: CPT

## 2022-04-20 PROCEDURE — 71045 X-RAY EXAM CHEST 1 VIEW: CPT

## 2022-04-20 PROCEDURE — 81001 URINALYSIS AUTO W/SCOPE: CPT

## 2022-04-20 RX ORDER — ACETAMINOPHEN 325 MG/1
650 TABLET ORAL ONCE
Status: COMPLETED | OUTPATIENT
Start: 2022-04-20 | End: 2022-04-20

## 2022-04-20 RX ORDER — KETOROLAC TROMETHAMINE 30 MG/ML
15 INJECTION, SOLUTION INTRAMUSCULAR; INTRAVENOUS ONCE
Status: COMPLETED | OUTPATIENT
Start: 2022-04-20 | End: 2022-04-20

## 2022-04-20 RX ADMIN — KETOROLAC TROMETHAMINE 15 MG: 30 INJECTION, SOLUTION INTRAMUSCULAR at 05:08

## 2022-04-20 RX ADMIN — ACETAMINOPHEN 650 MG: 325 TABLET ORAL at 05:09

## 2022-04-20 NOTE — ED ATTENDING ATTESTATION
4/20/2022  I, Ron Oneill MD, saw and evaluated the patient  I have discussed the patient with the resident/non-physician practitioner and agree with the resident's/non-physician practitioner's findings, Plan of Care, and MDM as documented in the resident's/non-physician practitioner's note, except where noted  All available labs and Radiology studies were reviewed  I was present for key portions of any procedure(s) performed by the resident/non-physician practitioner and I was immediately available to provide assistance  At this point I agree with the current assessment done in the Emergency Department  I have conducted an independent evaluation of this patient a history and physical is as follows:    ED Course         Critical Care Time  Procedures       Patient is a pleasant 22 yof who presents with bilateral rib pain      + recent mono  Some chills  Denies headache, chest pain, sore throat, shortness of breath  Pain is achi  Mild  Some worsening pain with palpation  MDM possible msk pain, will check labs/urine, if all normal defer from CT scan, discussed with patient, return precautions

## 2022-04-20 NOTE — ED PROVIDER NOTES
History  Chief Complaint   Patient presents with    Generalized Body Aches     pt recently diagnosed with mono c/o pain in bilateral rib area  Patient is a 27-year-old female with a past medical history of mononucleosis diagnosed in January and presents to the emergency department with a complaint of bilateral flank pain  The patient reports that she was seen in this emergency department 5 days ago with a similar complaint but her workup was negative  She reports yesterday afternoon her bilateral flank pain worsened and was severe with deep inspiration  She reports that she was experiencing chills and a temperature checked at home was 99° F  she reports that it resolved without medication but she presents this morning with and inability to find a position of comfort  She rates her bilateral flank pain as a 5/10 and describes it is achy and nonradiating  She denies lightheadedness, headache, chest pain, shortness of breath, abdominal pain, nausea, vomiting, dysuria, hematuria, rash or fever  She also denies being around anyone sick and has not been experiencing cough or rhinorrhea  Prior to Admission Medications   Prescriptions Last Dose Informant Patient Reported? Taking?   norethindrone-ethinyl estradiol (Junel FE 1/20) 1-20 MG-MCG per tablet   No No   Sig: Take 1 tablet by mouth daily      Facility-Administered Medications: None       Past Medical History:   Diagnosis Date    Mononucleosis        History reviewed  No pertinent surgical history  Family History   Problem Relation Age of Onset   Mcghee Lank' disease Mother     Hypertension Father      I have reviewed and agree with the history as documented      E-Cigarette/Vaping    E-Cigarette Use Current Every Day User      E-Cigarette/Vaping Substances    Nicotine Yes     THC No     CBD No     Flavoring Yes     Other No     Unknown No      Social History     Tobacco Use    Smoking status: Former Smoker     Types: Cigarettes    Smokeless tobacco: Never Used    Tobacco comment: quit 2-3 years   Vaping Use    Vaping Use: Every day    Substances: Nicotine, Flavoring   Substance Use Topics    Alcohol use: Not Currently     Comment: rarely    Drug use: Not Currently     Types: Marijuana        Review of Systems   Constitutional: Positive for chills  Negative for fever  HENT: Negative for congestion, ear pain, rhinorrhea and sore throat  Eyes: Negative for pain and visual disturbance  Respiratory: Negative for cough and shortness of breath  Cardiovascular: Negative for chest pain, palpitations and leg swelling  Gastrointestinal: Negative for abdominal distention, abdominal pain, diarrhea, nausea and vomiting  Endocrine: Negative  Genitourinary: Negative for dysuria and hematuria  Musculoskeletal: Positive for myalgias  Negative for arthralgias and back pain  Patient reports bilateral lower ribcage pain  Skin: Negative for color change and rash  Neurological: Negative for dizziness, seizures, syncope, weakness, light-headedness, numbness and headaches  Hematological: Negative  Psychiatric/Behavioral: Negative  All other systems reviewed and are negative  Physical Exam  ED Triage Vitals [04/20/22 0357]   Temp Pulse Respirations Blood Pressure SpO2   -- 88 18 124/84 98 %      Temp src Heart Rate Source Patient Position - Orthostatic VS BP Location FiO2 (%)   -- -- -- -- --      Pain Score       --             Orthostatic Vital Signs  Vitals:    04/20/22 0357   BP: 124/84   Pulse: 88       Physical Exam  Vitals and nursing note reviewed  Constitutional:       General: She is not in acute distress  Appearance: Normal appearance  She is well-developed and normal weight  She is not ill-appearing  HENT:      Head: Normocephalic and atraumatic  Nose: Nose normal       Mouth/Throat:      Mouth: Mucous membranes are moist       Pharynx: Oropharynx is clear     Eyes:      Extraocular Movements: Extraocular movements intact  Conjunctiva/sclera: Conjunctivae normal       Pupils: Pupils are equal, round, and reactive to light  Cardiovascular:      Rate and Rhythm: Normal rate and regular rhythm  Pulses: Normal pulses  Heart sounds: Normal heart sounds  No murmur heard  No friction rub  Pulmonary:      Effort: Pulmonary effort is normal  No respiratory distress  Breath sounds: Normal breath sounds  Abdominal:      General: Abdomen is flat  Bowel sounds are normal  There is no distension  Palpations: Abdomen is soft  Tenderness: There is no abdominal tenderness  There is no guarding or rebound  Musculoskeletal:         General: No swelling or tenderness  Normal range of motion  Cervical back: Normal range of motion and neck supple  No rigidity or tenderness  Right lower leg: No edema  Left lower leg: No edema  Lymphadenopathy:      Cervical: No cervical adenopathy  Skin:     General: Skin is warm and dry  Capillary Refill: Capillary refill takes less than 2 seconds  Coloration: Skin is not jaundiced  Findings: No erythema or rash  Neurological:      General: No focal deficit present  Mental Status: She is alert and oriented to person, place, and time  Mental status is at baseline  Cranial Nerves: No cranial nerve deficit  Sensory: No sensory deficit  Motor: No weakness           ED Medications  Medications   acetaminophen (TYLENOL) tablet 650 mg (650 mg Oral Given 4/20/22 0509)   ketorolac (TORADOL) injection 15 mg (15 mg Intravenous Given 4/20/22 0508)       Diagnostic Studies  Results Reviewed     Procedure Component Value Units Date/Time    Comprehensive metabolic panel [[id number]] Collected: 04/20/22 0444    Lab Status: Final result Specimen: Blood from Arm, Right Updated: 04/20/22 0517     Sodium 142 mmol/L      Potassium 3 7 mmol/L      Chloride 105 mmol/L      CO2 26 mmol/L      ANION GAP 11 mmol/L      BUN 8 mg/dL      Creatinine 0 76 mg/dL      Glucose 85 mg/dL      Calcium 9 4 mg/dL      AST 18 U/L      ALT 33 U/L      Alkaline Phosphatase 57 U/L      Total Protein 8 0 g/dL      Albumin 4 3 g/dL      Total Bilirubin 0 41 mg/dL      eGFR 113 ml/min/1 73sq m     Narrative:      National Kidney Disease Foundation guidelines for Chronic Kidney Disease (CKD):     Stage 1 with normal or high GFR (GFR > 90 mL/min/1 73 square meters)    Stage 2 Mild CKD (GFR = 60-89 mL/min/1 73 square meters)    Stage 3A Moderate CKD (GFR = 45-59 mL/min/1 73 square meters)    Stage 3B Moderate CKD (GFR = 30-44 mL/min/1 73 square meters)    Stage 4 Severe CKD (GFR = 15-29 mL/min/1 73 square meters)    Stage 5 End Stage CKD (GFR <15 mL/min/1 73 square meters)  Note: GFR calculation is accurate only with a steady state creatinine    CBC and differential [[id number]] Collected: 04/20/22 0444    Lab Status: Final result Specimen: Blood from Arm, Right Updated: 04/20/22 0458     WBC 5 23 Thousand/uL      RBC 4 54 Million/uL      Hemoglobin 14 9 g/dL      Hematocrit 43 7 %      MCV 96 fL      MCH 32 8 pg      MCHC 34 1 g/dL      RDW 11 8 %      MPV 10 3 fL      Platelets 551 Thousands/uL      nRBC 0 /100 WBCs      Neutrophils Relative 62 %      Immat GRANS % 1 %      Lymphocytes Relative 30 %      Monocytes Relative 7 %      Eosinophils Relative 0 %      Basophils Relative 0 %      Neutrophils Absolute 3 24 Thousands/µL      Immature Grans Absolute 0 03 Thousand/uL      Lymphocytes Absolute 1 56 Thousands/µL      Monocytes Absolute 0 37 Thousand/µL      Eosinophils Absolute 0 01 Thousand/µL      Basophils Absolute 0 02 Thousands/µL     Urine Microscopic [[id number]] Collected: 04/20/22 0439    Lab Status:  In process Specimen: Urine, Clean Catch Updated: 04/20/22 0453    POCT pregnancy, urine [[id number]]  (Normal) Resulted: 04/20/22 0444    Lab Status: Final result Updated: 04/20/22 0444     EXT PREG TEST UR (Ref: Negative) Negative     Control Valid    Urine Macroscopic, POC [[id number]]  (Abnormal) Collected: 04/20/22 0439    Lab Status: Final result Specimen: Urine Updated: 04/20/22 0441     Color, UA Yellow     Clarity, UA Clear     pH, UA 5 5     Leukocytes, UA Negative     Nitrite, UA Negative     Protein, UA Negative mg/dl      Glucose, UA Negative mg/dl      Ketones, UA Negative mg/dl      Urobilinogen, UA 0 2 E U /dl      Bilirubin, UA Negative     Blood, UA Large     Specific [place], UA 1 020    Narrative:      CLINITEK RESULT                 XR chest 1 view portable   ED Interpretation by Per Prieto DO (04/20 0716)   No acute cardiopulmonary disease  Procedures  Procedures      ED Course  ED Course as of 04/20/22 0531   Wed Apr 20, 2022   0446 Blood, UA(!): Large  Patient is currently on her menstrual period  6848 Chest x-ray was unremarkable  I have given the patient Tylenol and Toradol at this time and will reassess post treatment  0530 I reassessed the patient at bedside post Toradol and acetaminophen and she reports that her pain is 1/10  She has been instructed to take Tylenol or Motrin as needed every 6 hours with small meals for pain management and follow-up with her PCP on outpatient basis  Further instructions per discharge orders                       PERC Rule for PE      Most Recent Value   PERC Rule for PE    Age >=50 0 Filed at: 04/20/2022 0415   HR >=100 0 Filed at: 04/20/2022 0415   O2 Sat on room air < 95% 0 Filed at: 04/20/2022 0415   History of PE or DVT 0 Filed at: 04/20/2022 0415   Recent trauma or surgery 0 Filed at: 04/20/2022 0415   Hemoptysis 0 Filed at: 04/20/2022 0415   Exogenous estrogen 0 Filed at: 04/20/2022 0415   Unilateral leg swelling 0 Filed at: 04/20/2022 0415   PERC Rule for PE Results 0 Filed at: 04/20/2022 0523                  Wells' Criteria for PE      Most Recent Value   Wells' Criteria for PE    Clinical signs and symptoms of DVT 0 Filed at: 04/20/2022 4771   PE is primary diagnosis or equally likely 0 Filed at: 04/20/2022 0415   HR >100 0 Filed at: 04/20/2022 0415   Immobilization at least 3 days or Surgery in the previous 4 weeks 0 Filed at: 04/20/2022 0415   Previous, objectively diagnosed PE or DVT 0 Filed at: 04/20/2022 0415   Hemoptysis 0 Filed at: 04/20/2022 1973   Malignancy with treatment within 6 months or palliative 0 Filed at: 04/20/2022 7934   Wells' Criteria Total 0 Filed at: 04/20/2022 8884            [facility]  Number of Diagnoses or Management Options  Diagnosis management comments: Patient is a 73-year-old female with a past medical history of mononucleosis diagnosed in January and presents to the emergency department with a complaint of bilateral flank pain  Upon initial presentation the patient was A&O x4 and in no acute distress  Her physical exam is grossly unremarkable  The patient Perks out and has a Wells score of 0  I do not suspect a PE at this time  It has been greater than a week since her diagnosis of mononucleosis in her CT scan 5 days ago was non concerning for splenomegaly  Disposition  Final diagnoses:   Bilateral flank pain     Time reflects when diagnosis was documented in both MDM as applicable and the Disposition within this note     Time User Action Codes Description Comment    4/20/2022  5:21 AM Alexandr Albert Add [R10 9] Bilateral flank pain       ED Disposition     ED Disposition Condition Date/Time Comment    Discharge Stable Wed Apr 20, 2022  5:21 AM Mason Caballero discharge to home/self care  Follow-up Information     Follow up With Specialties Details Why Contact Info Additional Information    Lamar Guerrier MD Pediatrics Schedule an appointment as soon as possible for a visit  for continued flank pain   [address] [place] [address] Emergency Department Emergency Medicine  As needed [address] [place] [address]  [phone] [address] Emergency Department, [address], [place], [place], [address]          Discharge Medication List as of 4/20/2022  5:23 AM      CONTINUE these medications which have NOT CHANGED    Details   norethindrone-ethinyl estradiol (Junel FE 1/20) 1-20 MG-MCG per tablet Take 1 tablet by mouth daily, Starting Wed 12/1/2021, Normal           No discharge procedures on file  PDMP Review     None           ED Provider  Attending physically available and evaluated Gino Leonardo I managed the patient along with the ED Attending      Electronically Signed by         Alana Jj DO  04/20/22 4433

## 2022-04-20 NOTE — DISCHARGE INSTRUCTIONS
Your labs and imaging in the emergency department were unremarkable  You have been encouraged to take Tylenol or ibuprofen as needed every 6 hours with small meals pain management  Your urine microscope study is still pending  Should indicate a UTI and antibiotics will be called into her pharmacy electronically  Please check your my chart for results  Please follow-up with your PCP if her symptoms do not begin to improve in the next 24-48 hours  If you develop chest pain, shortness of breath, blood in your urine, lightheadedness, fever, pain uncontrolled with medication or any other symptoms you find concerning please return to the emergency department

## 2022-04-23 ENCOUNTER — OFFICE VISIT (OUTPATIENT)
Dept: URGENT CARE | Facility: MEDICAL CENTER | Age: 22
End: 2022-04-23
Payer: COMMERCIAL

## 2022-04-23 VITALS
HEIGHT: 64 IN | TEMPERATURE: 98 F | HEART RATE: 80 BPM | OXYGEN SATURATION: 99 % | BODY MASS INDEX: 22.36 KG/M2 | RESPIRATION RATE: 18 BRPM | WEIGHT: 131 LBS

## 2022-04-23 DIAGNOSIS — R10.9 ABDOMINAL PAIN, UNSPECIFIED ABDOMINAL LOCATION: Primary | ICD-10-CM

## 2022-04-23 PROCEDURE — 99213 OFFICE O/P EST LOW 20 MIN: CPT | Performed by: PHYSICIAN ASSISTANT

## 2022-04-23 RX ORDER — PREDNISONE 20 MG/1
20 TABLET ORAL 2 TIMES DAILY WITH MEALS
Qty: 10 TABLET | Refills: 0 | Status: SHIPPED | OUTPATIENT
Start: 2022-04-23 | End: 2022-04-28

## 2022-04-23 NOTE — PROGRESS NOTES
[facility]        NAME: Ry Sol is a 22 y o  female  : 2000    MRN: [id number]  DATE: 2022  TIME: 5:19 PM    Assessment and Plan   Abdominal pain, unspecified abdominal location [R10 9]  1  Abdominal pain, unspecified abdominal location  predniSONE 20 mg tablet         Patient Instructions     1  Increase fluids  2  Take prednisone 20mg  Twice daily x 5 days  3  Tylenol as needed for pain  4  Follow up with PCP in 3-5 days if symptoms persist       Chief Complaint     Chief Complaint   Patient presents with    Abdominal Pain     abdominal discomfort ongoing for a few weeks; seen in ED twice for the same; here to receive medication to help with "discomfort"          History of Present Illness       Abhi Hernandez is a 60-year-old female presents with a two-month history of intermittent episodes of abdominal pain  Patient reports her symptoms started after she was diagnosed with mono on 22  She reports ongoing issues with diffuse abdominal discomfort, flank pain and cramping  Patient was seen in the ED on 2 different occasions, CT of her abdomen was completed 1 week prior with normal findings  Patient reports she continues to have symptoms, she denies any new fever, chills, body aches, vomiting or diarrhea since her most recent symptoms  Abdominal Pain  Pertinent negatives include no diarrhea, nausea or vomiting  Review of Systems   Review of Systems   Constitutional: Negative  HENT: Negative  Respiratory: Negative  Gastrointestinal: Positive for abdominal pain  Negative for diarrhea, nausea and vomiting           Current Medications       Current Outpatient Medications:     norethindrone-ethinyl estradiol (Junel FE 1/20) 1-20 MG-MCG per tablet, Take 1 tablet by mouth daily, Disp: 84 tablet, Rfl: 3    predniSONE 20 mg tablet, Take 1 tablet (20 mg total) by mouth 2 (two) times a day with meals for 5 days, Disp: 10 tablet, Rfl: 0    Current Allergies Allergies as of 04/23/2022    (No Known Allergies)            The following portions of the patient's history were reviewed and updated as appropriate: allergies, current medications, past family history, past medical history, past social history, past surgical history and problem list      Past Medical History:   Diagnosis Date    Mononucleosis        History reviewed  No pertinent surgical history  Family History   Problem Relation Age of Onset   Claudeen Furl' disease Mother     Hypertension Father          Medications have been verified  Objective   Pulse 80   Temp 98 °F (36 7 °C)   Resp 18   Ht 5' 4" (1 626 m)   Wt 59 4 kg (131 lb)   LMP 03/30/2022   SpO2 99%   BMI 22 49 kg/m²   Patient's last menstrual period was 03/30/2022  Physical Exam     Physical Exam  Constitutional:       General: She is not in acute distress  Appearance: She is well-developed  She is not ill-appearing  HENT:      Head: Normocephalic and atraumatic  Right Ear: Tympanic membrane and ear canal normal       Left Ear: Tympanic membrane and ear canal normal       Nose: Nose normal       Mouth/Throat:      Lips: Pink  Pharynx: Oropharynx is clear  Cardiovascular:      Rate and Rhythm: Normal rate and regular rhythm  Heart sounds: Normal heart sounds  No murmur heard  Pulmonary:      Effort: Pulmonary effort is normal       Breath sounds: Normal breath sounds  Abdominal:      General: Abdomen is flat  Bowel sounds are normal       Palpations: Abdomen is soft  Tenderness: There is generalized abdominal tenderness  There is no right CVA tenderness, left CVA tenderness, guarding or rebound  Hernia: No hernia is present  Lymphadenopathy:      Head:      Right side of head: Tonsillar adenopathy present  Left side of head: Tonsillar adenopathy present  Cervical: Cervical adenopathy present  Right cervical: Posterior cervical adenopathy present        Left cervical: Posterior cervical adenopathy present  Neurological:      Mental Status: She is alert  Discussed patient's history and physical exam findings, concern for possible mesenteric adenitis  Will start prednisone 20 mg twice daily for 5 days    Advised patient follow-up with PCP if symptoms worsen or persist

## 2022-04-23 NOTE — PATIENT INSTRUCTIONS
1  Increase fluids  2  Take prednisone 20mg  Twice daily x 5 days  3  Tylenol as needed for pain  4   Follow up with PCP in 3-5 days if symptoms persist

## 2022-05-02 ENCOUNTER — TELEPHONE (OUTPATIENT)
Dept: INFECTIOUS DISEASES | Facility: CLINIC | Age: 22
End: 2022-05-02

## 2022-05-02 NOTE — TELEPHONE ENCOUNTER
Pt called stating that PCP Dr Alberto Parikh recommends her to get seen for chronic mono  Diagnosed with mono January 31 2022  She states that she gets better for awhile, then get worse  Her spleen was swollen entire time of having mono then getting better then went down completely  It causes her to miss work up to a month  Lymph nodes have been swollen in the neck, liver inflammation, fatigue, headache all of which are intermittent  She is going to be getting US on Friday for the spleen  She states can get labs, referral from PCP  [place] office [phone]  Called over to Dr Hanna Diego office to send over referral and any office notes, labs, imagining regarding reason being referred  The  stated she will gather everything together and fax it over

## 2022-05-04 ENCOUNTER — APPOINTMENT (EMERGENCY)
Dept: CT IMAGING | Facility: HOSPITAL | Age: 22
End: 2022-05-04
Payer: COMMERCIAL

## 2022-05-04 ENCOUNTER — HOSPITAL ENCOUNTER (EMERGENCY)
Facility: HOSPITAL | Age: 22
Discharge: HOME | End: 2022-05-05
Attending: EMERGENCY MEDICINE | Admitting: EMERGENCY MEDICINE
Payer: COMMERCIAL

## 2022-05-04 ENCOUNTER — APPOINTMENT (EMERGENCY)
Dept: RADIOLOGY | Facility: HOSPITAL | Age: 22
End: 2022-05-04
Payer: COMMERCIAL

## 2022-05-04 DIAGNOSIS — M25.512 LEFT SHOULDER PAIN: Primary | ICD-10-CM

## 2022-05-04 DIAGNOSIS — D72.829 LEUKOCYTOSIS: ICD-10-CM

## 2022-05-04 DIAGNOSIS — R10.9 ABDOMINAL PAIN: ICD-10-CM

## 2022-05-04 LAB
ALBUMIN SERPL BCP-MCNC: 4.5 G/DL (ref 3.5–5)
ALP SERPL-CCNC: 44 U/L (ref 34–104)
ALT SERPL W P-5'-P-CCNC: 43 U/L (ref 7–52)
ANION GAP SERPL CALCULATED.3IONS-SCNC: 12 MMOL/L (ref 4–13)
AST SERPL W P-5'-P-CCNC: 17 U/L (ref 13–39)
BASOPHILS # BLD AUTO: 0.03 THOUSANDS/ΜL (ref 0–0.1)
BASOPHILS NFR BLD AUTO: 0 % (ref 0–1)
BILIRUB SERPL-MCNC: 0.37 MG/DL (ref 0.2–1)
BUN SERPL-MCNC: 15 MG/DL (ref 5–25)
CALCIUM SERPL-MCNC: 9.3 MG/DL (ref 8.4–10.2)
CARDIAC TROPONIN I PNL SERPL HS: <2 NG/L
CARDIAC TROPONIN I PNL SERPL HS: <2 NG/L
CHLORIDE SERPL-SCNC: 101 MMOL/L (ref 96–108)
CK SERPL-CCNC: 28 U/L (ref 26–192)
CO2 SERPL-SCNC: 24 MMOL/L (ref 21–32)
CREAT SERPL-MCNC: 0.74 MG/DL (ref 0.6–1.3)
D DIMER PPP FEU-MCNC: <0.27 UG/ML FEU
EOSINOPHIL # BLD AUTO: 0 THOUSAND/ΜL (ref 0–0.61)
EOSINOPHIL NFR BLD AUTO: 0 % (ref 0–6)
ERYTHROCYTE [DISTWIDTH] IN BLOOD BY AUTOMATED COUNT: 12.1 % (ref 11.6–15.1)
EXT PREG TEST URINE: NEGATIVE
EXT. CONTROL ED NAV: NORMAL
GFR SERPL CREATININE-BSD FRML MDRD: 116 ML/MIN/1.73SQ M
GLUCOSE SERPL-MCNC: 115 MG/DL (ref 65–140)
HCT VFR BLD AUTO: 43.9 % (ref 34.8–46.1)
HGB BLD-MCNC: 14.6 G/DL (ref 11.5–15.4)
IMM GRANULOCYTES # BLD AUTO: 0.25 THOUSAND/UL (ref 0–0.2)
IMM GRANULOCYTES NFR BLD AUTO: 2 % (ref 0–2)
LIPASE SERPL-CCNC: 18 U/L (ref 11–82)
LYMPHOCYTES # BLD AUTO: 1.27 THOUSANDS/ΜL (ref 0.6–4.47)
LYMPHOCYTES NFR BLD AUTO: 9 % (ref 14–44)
MCH RBC QN AUTO: 32.7 PG (ref 26.8–34.3)
MCHC RBC AUTO-ENTMCNC: 33.3 G/DL (ref 31.4–37.4)
MCV RBC AUTO: 98 FL (ref 82–98)
MONOCYTES # BLD AUTO: 0.84 THOUSAND/ΜL (ref 0.17–1.22)
MONOCYTES NFR BLD AUTO: 6 % (ref 4–12)
NEUTROPHILS # BLD AUTO: 11.64 THOUSANDS/ΜL (ref 1.85–7.62)
NEUTS SEG NFR BLD AUTO: 83 % (ref 43–75)
NRBC BLD AUTO-RTO: 0 /100 WBCS
PLATELET # BLD AUTO: 406 THOUSANDS/UL (ref 149–390)
PMV BLD AUTO: 9.3 FL (ref 8.9–12.7)
POTASSIUM SERPL-SCNC: 4 MMOL/L (ref 3.5–5.3)
PROT SERPL-MCNC: 7.4 G/DL (ref 6.4–8.4)
RBC # BLD AUTO: 4.47 MILLION/UL (ref 3.81–5.12)
SODIUM SERPL-SCNC: 137 MMOL/L (ref 135–147)
TSH SERPL DL<=0.05 MIU/L-ACNC: 0.93 UIU/ML (ref 0.45–4.5)
WBC # BLD AUTO: 14.03 THOUSAND/UL (ref 4.31–10.16)

## 2022-05-04 PROCEDURE — 81025 URINE PREGNANCY TEST: CPT | Performed by: PHYSICIAN ASSISTANT

## 2022-05-04 PROCEDURE — 85025 COMPLETE CBC W/AUTO DIFF WBC: CPT | Performed by: EMERGENCY MEDICINE

## 2022-05-04 PROCEDURE — 80053 COMPREHEN METABOLIC PANEL: CPT | Performed by: EMERGENCY MEDICINE

## 2022-05-04 PROCEDURE — 84443 ASSAY THYROID STIM HORMONE: CPT | Performed by: PHYSICIAN ASSISTANT

## 2022-05-04 PROCEDURE — 93005 ELECTROCARDIOGRAM TRACING: CPT

## 2022-05-04 PROCEDURE — 84484 ASSAY OF TROPONIN QUANT: CPT | Performed by: EMERGENCY MEDICINE

## 2022-05-04 PROCEDURE — 82550 ASSAY OF CK (CPK): CPT | Performed by: PHYSICIAN ASSISTANT

## 2022-05-04 PROCEDURE — 85379 FIBRIN DEGRADATION QUANT: CPT | Performed by: PHYSICIAN ASSISTANT

## 2022-05-04 PROCEDURE — 74177 CT ABD & PELVIS W/CONTRAST: CPT

## 2022-05-04 PROCEDURE — 81001 URINALYSIS AUTO W/SCOPE: CPT | Performed by: PHYSICIAN ASSISTANT

## 2022-05-04 PROCEDURE — 83690 ASSAY OF LIPASE: CPT | Performed by: PHYSICIAN ASSISTANT

## 2022-05-04 PROCEDURE — 71045 X-RAY EXAM CHEST 1 VIEW: CPT

## 2022-05-04 PROCEDURE — 36415 COLL VENOUS BLD VENIPUNCTURE: CPT

## 2022-05-04 PROCEDURE — 99284 EMERGENCY DEPT VISIT MOD MDM: CPT

## 2022-05-04 PROCEDURE — G1004 CDSM NDSC: HCPCS

## 2022-05-04 PROCEDURE — 99285 EMERGENCY DEPT VISIT HI MDM: CPT | Performed by: PHYSICIAN ASSISTANT

## 2022-05-04 PROCEDURE — 71260 CT THORAX DX C+: CPT

## 2022-05-05 VITALS
HEART RATE: 67 BPM | TEMPERATURE: 98.1 F | DIASTOLIC BLOOD PRESSURE: 71 MMHG | SYSTOLIC BLOOD PRESSURE: 122 MMHG | RESPIRATION RATE: 18 BRPM | OXYGEN SATURATION: 99 %

## 2022-05-05 LAB
ATRIAL RATE: 110 BPM
ATRIAL RATE: 53 BPM
BACTERIA UR QL AUTO: NORMAL /HPF
BILIRUB UR QL STRIP: NEGATIVE
CLARITY UR: CLEAR
COLOR UR: ABNORMAL
GLUCOSE UR STRIP-MCNC: NEGATIVE MG/DL
HGB UR QL STRIP.AUTO: NEGATIVE
KETONES UR STRIP-MCNC: NEGATIVE MG/DL
LEUKOCYTE ESTERASE UR QL STRIP: ABNORMAL
NITRITE UR QL STRIP: NEGATIVE
NON-SQ EPI CELLS URNS QL MICRO: NORMAL /HPF
P AXIS: 51 DEGREES
P AXIS: 76 DEGREES
PH UR STRIP.AUTO: 6.5 [PH]
PR INTERVAL: 114 MS
PR INTERVAL: 128 MS
PROT UR STRIP-MCNC: NEGATIVE MG/DL
QRS AXIS: 48 DEGREES
QRS AXIS: 50 DEGREES
QRSD INTERVAL: 74 MS
QRSD INTERVAL: 74 MS
QT INTERVAL: 310 MS
QT INTERVAL: 442 MS
QTC INTERVAL: 412 MS
QTC INTERVAL: 414 MS
RBC #/AREA URNS AUTO: NORMAL /HPF
SP GR UR STRIP.AUTO: <=1.005 (ref 1–1.03)
T WAVE AXIS: 58 DEGREES
T WAVE AXIS: 73 DEGREES
UROBILINOGEN UR QL STRIP.AUTO: 0.2 E.U./DL
VENTRICULAR RATE: 106 BPM
VENTRICULAR RATE: 53 BPM
WBC #/AREA URNS AUTO: NORMAL /HPF

## 2022-05-05 PROCEDURE — 93005 ELECTROCARDIOGRAM TRACING: CPT

## 2022-05-05 PROCEDURE — 93010 ELECTROCARDIOGRAM REPORT: CPT | Performed by: INTERNAL MEDICINE

## 2022-05-05 PROCEDURE — 96374 THER/PROPH/DIAG INJ IV PUSH: CPT

## 2022-05-05 RX ORDER — KETOROLAC TROMETHAMINE 30 MG/ML
15 INJECTION, SOLUTION INTRAMUSCULAR; INTRAVENOUS ONCE
Status: COMPLETED | OUTPATIENT
Start: 2022-05-05 | End: 2022-05-05

## 2022-05-05 RX ADMIN — IOHEXOL 100 ML: 350 INJECTION, SOLUTION INTRAVENOUS at 00:07

## 2022-05-05 RX ADMIN — KETOROLAC TROMETHAMINE 15 MG: 30 INJECTION, SOLUTION INTRAMUSCULAR at 01:14

## 2022-05-05 NOTE — ED PROVIDER NOTES
History  Chief Complaint   Patient presents with    Shoulder Pain     Pt was dx with mono jan 31st, having spleen "pressure", chest pressure while laying down, 5/10 L shoulder pain with deep inspiration  Denies hx blood clots  Denies NVD  Patient patient is a 81-year-old female presenting to the emergency room for evaluation  Patient states since today she has had left upper chest pain that radiates into her left shoulder  Worse with inspiration  States it comes and goes  No trauma to the shoulder  She states she also has some left flank pain  Patient was diagnosed with mono in January and is very concerned that her spleen is enlarged  She denies any other symptoms including nausea, vomiting, dizziness, diarrhea, headache, shortness of breath  History provided by:  Patient   used: No        Prior to Admission Medications   Prescriptions Last Dose Informant Patient Reported? Taking?   norethindrone-ethinyl estradiol (Junel FE 1/20) 1-20 MG-MCG per tablet   No No   Sig: Take 1 tablet by mouth daily      Facility-Administered Medications: None       Past Medical History:   Diagnosis Date    Mononucleosis        History reviewed  No pertinent surgical history  Family History   Problem Relation Age of Onset   Spring Aparicio' disease Mother     Hypertension Father      I have reviewed and agree with the history as documented      E-Cigarette/Vaping    E-Cigarette Use Current Every Day User      E-Cigarette/Vaping Substances    Nicotine Yes     THC No     CBD No     Flavoring Yes     Other No     Unknown No      Social History     Tobacco Use    Smoking status: Former Smoker     Types: Cigarettes    Smokeless tobacco: Never Used    Tobacco comment: quit 2-3 years   Vaping Use    Vaping Use: Every day    Substances: Nicotine, Flavoring   Substance Use Topics    Alcohol use: Not Currently     Comment: rarely    Drug use: Not Currently     Types: Marijuana     Comment: socially       Review of Systems   Constitutional: Negative for chills and fever  HENT: Negative for ear pain and sore throat  Eyes: Negative for pain and visual disturbance  Respiratory: Negative for cough and shortness of breath  Cardiovascular: Positive for chest pain  Negative for palpitations  Gastrointestinal: Negative for abdominal pain and vomiting  Genitourinary: Positive for flank pain  Negative for dysuria and hematuria  Musculoskeletal: Positive for arthralgias (left shoulder pain)  Negative for back pain  Skin: Negative for color change and rash  Neurological: Negative for seizures and syncope  All other systems reviewed and are negative  Physical Exam  Physical Exam  Vitals and nursing note reviewed  Constitutional:       General: She is not in acute distress  Appearance: She is well-developed  HENT:      Head: Normocephalic and atraumatic  Eyes:      Conjunctiva/sclera: Conjunctivae normal    Cardiovascular:      Rate and Rhythm: Normal rate and regular rhythm  Heart sounds: No murmur heard  Pulmonary:      Effort: Pulmonary effort is normal  No respiratory distress  Breath sounds: Normal breath sounds  Abdominal:      Palpations: Abdomen is soft  There is no splenomegaly  Tenderness: There is abdominal tenderness  Comments: Minimal LUQ tenderness    Musculoskeletal:      Cervical back: Neck supple  Skin:     General: Skin is warm and dry  Neurological:      Mental Status: She is alert           Vital Signs  ED Triage Vitals   Temperature Pulse Respirations Blood Pressure SpO2   05/04/22 1906 05/04/22 1906 05/04/22 1906 05/04/22 1906 05/04/22 1906   98 1 °F (36 7 °C) 94 16 154/81 98 %      Temp Source Heart Rate Source Patient Position - Orthostatic VS BP Location FiO2 (%)   05/04/22 1906 05/04/22 1906 -- 05/04/22 1906 --   Oral Monitor  Left arm       Pain Score       05/05/22 0114       5           Vitals:    05/04/22 1906 05/05/22 0017   BP: 154/81 122/71   Pulse: 94 67         Visual Acuity      ED Medications  Medications   iohexol (OMNIPAQUE) 350 MG/ML injection (SINGLE-DOSE) 100 mL (100 mL Intravenous Given 5/5/22 0007)   ketorolac (TORADOL) injection 15 mg (15 mg Intravenous Given 5/5/22 0114)       Diagnostic Studies  Results Reviewed     Procedure Component Value Units Date/Time    Urine Microscopic [[id number]]  (Normal) Collected: 05/04/22 2359    Lab Status: Final result Specimen: Urine, Clean Catch Updated: 05/05/22 0046     RBC, UA 0-1 /hpf      WBC, UA 2-4 /hpf      Epithelial Cells Occasional /hpf      Bacteria, UA Occasional /hpf     UA (URINE) with reflex to Scope [[id number]]  (Abnormal) Collected: 05/04/22 2359    Lab Status: Final result Specimen: Urine, Clean Catch Updated: 05/05/22 0009     Color, UA Light Yellow     Clarity, UA Clear     Specific Gravity, UA <=1 005     pH, UA 6 5     Leukocytes, UA Trace     Nitrite, UA Negative     Protein, UA Negative mg/dl      Glucose, UA Negative mg/dl      Ketones, UA Negative mg/dl      Urobilinogen, UA 0 2 E U /dl      Bilirubin, UA Negative     Blood, UA Negative    POCT pregnancy, urine [[id number]]  (Normal) Resulted: 05/04/22 2357    Lab Status: Final result Updated: 05/04/22 2357     EXT PREG TEST UR (Ref: Negative) negative     Control valid    TSH, 3rd generation with Free T4 reflex [[id number]]  (Normal) Collected: 05/04/22 1915    Lab Status: Final result Specimen: Blood from Arm, Right Updated: 05/04/22 2330     TSH 3RD GENERATON 0 927 uIU/mL     Narrative:      Patients undergoing fluorescein dye angiography may retain small amounts of fluorescein in the body for 48-72 hours post procedure  Samples containing fluorescein can produce falsely depressed TSH values  If the patient had this procedure,a specimen should be resubmitted post fluorescein clearance        Lipase [[id number]]  (Normal) Collected: 05/04/22 1915    Lab Status: Final result Specimen: Blood from Arm, Right Updated: 05/04/22 2302     Lipase 18 u/L     CK [[id number]]  (Normal) Collected: 05/04/22 1915    Lab Status: Final result Specimen: Blood from Arm, Right Updated: 05/04/22 2302     Total CK 28 U/L     HS Troponin I 2hr [[id number]] Collected: 05/04/22 2110    Lab Status: Final result Specimen: Blood from Arm, Right Updated: 05/04/22 2207     hs TnI 2hr <2 ng/L      Delta 2hr hsTnI --    D-dimer, quantitative [[id number]]  (Normal) Collected: 05/04/22 2110    Lab Status: Final result Specimen: Blood from Arm, Right Updated: 05/04/22 2201     D-Dimer, Quant <0 27 ug/ml U     Comprehensive metabolic panel [[id number]] Collected: 05/04/22 1915    Lab Status: Final result Specimen: Blood from Arm, Right Updated: 05/04/22 2026     Sodium 137 mmol/L      Potassium 4 0 mmol/L      Chloride 101 mmol/L      CO2 24 mmol/L      ANION GAP 12 mmol/L      BUN 15 mg/dL      Creatinine 0 74 mg/dL      Glucose 115 mg/dL      Calcium 9 3 mg/dL      AST 17 U/L      ALT 43 U/L      Alkaline Phosphatase 44 U/L      Total Protein 7 4 g/dL      Albumin 4 5 g/dL      Total Bilirubin 0 37 mg/dL      eGFR 116 ml/min/1 73sq m     Narrative:      [facility] guidelines for Chronic Kidney Disease (CKD):     Stage 1 with normal or high GFR (GFR > 90 mL/min/1 73 square meters)    Stage 2 Mild CKD (GFR = 60-89 mL/min/1 73 square meters)    Stage 3A Moderate CKD (GFR = 45-59 mL/min/1 73 square meters)    Stage 3B Moderate CKD (GFR = 30-44 mL/min/1 73 square meters)    Stage 4 Severe CKD (GFR = 15-29 mL/min/1 73 square meters)    Stage 5 End Stage CKD (GFR <15 mL/min/1 73 square meters)  Note: GFR calculation is accurate only with a steady state creatinine    HS Troponin 0hr (reflex protocol) [[id number]]  (Normal) Collected: 05/04/22 1915    Lab Status: Final result Specimen: Blood from Arm, Right Updated: 05/04/22 1948     hs TnI 0hr <2 ng/L     CBC and differential [[id number]]  (Abnormal) Collected: 05/04/22 1915    Lab Status: Final result Specimen: Blood from Arm, Right Updated: 05/04/22 1925     WBC 14 03 Thousand/uL      RBC 4 47 Million/uL      Hemoglobin 14 6 g/dL      Hematocrit 43 9 %      MCV 98 fL      MCH 32 7 pg      MCHC 33 3 g/dL      RDW 12 1 %      MPV 9 3 fL      Platelets 954 Thousands/uL      nRBC 0 /100 WBCs      Neutrophils Relative 83 %      Immat GRANS % 2 %      Lymphocytes Relative 9 %      Monocytes Relative 6 %      Eosinophils Relative 0 %      Basophils Relative 0 %      Neutrophils Absolute 11 64 Thousands/µL      Immature Grans Absolute 0 25 Thousand/uL      Lymphocytes Absolute 1 27 Thousands/µL      Monocytes Absolute 0 84 Thousand/µL      Eosinophils Absolute 0 00 Thousand/µL      Basophils Absolute 0 03 Thousands/µL                  CT chest abdomen pelvis w contrast   Final Result by Josue Contreras MD (05/05 0125)      No acute findings in the thorax, abdomen, or pelvis  Small volume pelvic free fluid likely physiologic given patient age and gender  Workstation performed: [id number]         XR chest 1 view portable   Final Result by Caron Oropeza MD (05/04 2232)      No acute cardiopulmonary disease                    Workstation performed: [id number]                    Procedures  ECG 12 Lead Documentation Only    Date/Time: 5/4/2022 7:14 PM  Performed by: Claus Gonzales PA-C  Authorized by: Claus Gonzales PA-C     ECG reviewed by me, the ED Provider: yes    Patient location:  ED  Rate:     ECG rate:  106    ECG rate assessment: tachycardic    Rhythm:     Rhythm: sinus tachycardia    Ectopy:     Ectopy: none    QRS:     QRS axis:  Normal  Conduction:     Conduction: normal    ST segments:     ST segments:  Normal  T waves:     T waves: normal    ECG 12 Lead Documentation Only    Date/Time: 5/5/2022 2:08 AM  Performed by: Claus Gonzales PA-C  Authorized by: Claus Gonzales PA-C     Rate:     ECG rate:  53    ECG rate assessment: bradycardic    Rhythm: Rhythm: sinus bradycardia    Ectopy:     Ectopy: none    QRS:     QRS axis:  Normal  Conduction:     Conduction: normal    ST segments:     ST segments:  Normal  T waves:     T waves: normal               ED Course  ED Course as of 05/05/22 0534   Thu May 05, 2022   0200 Re-evaluation at bedside, patient feels better, would like to go home  Updated patient on labs and imaging results  Counseling as below  The appropriate recommended follow up and warning signs for return to the nearest ED were explained  patient's questions answered; patient is competent and reliable, verbalized understanding of all that was explained, and agrees with the disposition and further plan of treatment  patient was additionally provided with detailed follow up care instructions, phone numbers to call within our institution for other concerns or inquiries  Advised to follow up with PCP  Pt is in no acute distress and is stable for discharge at this time                HEART Risk Score      Most Recent Value   Heart Score Risk Calculator    History 0 Filed at: 05/05/2022 0531   ECG 0 Filed at: 05/05/2022 0531   Age 0 Filed at: 05/05/2022 0531   Risk Factors 0 Filed at: 05/05/2022 0531   Troponin 0 Filed at: 05/05/2022 0531   HEART Score 0 Filed at: 05/05/2022 0531                PERC Rule for PE      Most Recent Value   PERC Rule for PE    Age >=50 0 Filed at: 05/05/2022 0531   HR >=100 1 Filed at: 05/05/2022 0531   O2 Sat on room air < 95% 0 Filed at: 05/05/2022 0531   History of PE or DVT 0 Filed at: 05/05/2022 0531   Recent trauma or surgery 0 Filed at: 05/05/2022 0531   Hemoptysis 0 Filed at: 05/05/2022 0531   Exogenous estrogen 1 Filed at: 05/05/2022 0531   Unilateral leg swelling 0 Filed at: 05/05/2022 0531   PERC Rule for PE Results 2 Filed at: 05/05/2022 0531                  Wells' Criteria for PE      Most Recent Value   Wells' Criteria for PE    Clinical signs and symptoms of DVT 0 Filed at: 05/05/2022 4478   PE is primary diagnosis or equally likely 0 Filed at: 05/05/2022 0531   HR >100 1 5 Filed at: 05/05/2022 0531   Immobilization at least 3 days or Surgery in the previous 4 weeks 0 Filed at: 05/05/2022 0531   Previous, objectively diagnosed PE or DVT 0 Filed at: 05/05/2022 0531   Hemoptysis 0 Filed at: 05/05/2022 0531   Malignancy with treatment within 6 months or palliative 0 Filed at: 05/05/2022 9484   Wells' Criteria Total 1 5 Filed at: 05/05/2022 0531                MDM  Number of Diagnoses or Management Options  Abdominal pain: new and requires workup  Left shoulder pain: new and requires workup  Leukocytosis: new and requires workup  Diagnosis management comments: Patient presenting to the emergency room for evaluation of left upper chest pain radiating to the left shoulder and left flank pain  Patient has a history of mono diagnosed in January, she is very concerned that her spleen is enlarged  Patient states the chest plain is worse with taking deep breaths in  Patient does take oral birth control pills  On exam patient is in no acute distress  She has minimal left upper quadrant tenderness  No splenomegaly felt  CBC showing a minimal leukocytosis  CMP is unremarkable  Normal troponins  D-dimer is normal   Urine is not concerning for infection  Patient's CT is unremarkable for any acute pathology  Patient was treated with Toradol with significant relief  Patient was advised of her elevated white blood cell count, advised she should follow up with primary doctor for repeat testing  Patient had significant relief after Toradol, and is comfortable being discharged at this time  Advised to follow-up with PCP  Return to the ER if anything acutely changes         Amount and/or Complexity of Data Reviewed  Clinical lab tests: ordered and reviewed  Tests in the radiology section of CPT®: ordered and reviewed    Patient Progress  Patient progress: stable      Disposition  Final diagnoses:   Left shoulder pain Abdominal pain   Leukocytosis     Time reflects when diagnosis was documented in both MDM as applicable and the Disposition within this note     Time User Action Codes Description Comment    5/5/2022  2:25 AM Melody Santiago Add [M25 512] Left shoulder pain     5/5/2022  2:25 AM Melody Santiago Add [R10 9] Abdominal pain     5/5/2022  2:25 AM Melody Santiago Add [P07 001] Leukocytosis       ED Disposition     ED Disposition Condition Date/Time Comment    Discharge Stable Thu May 5, 2022  2:25 AM Lexine Book discharge to home/self care  Follow-up Information     Follow up With Specialties Details Why Contact Info Additional Information    Aimee Graves MD Pediatrics Schedule an appointment as soon as possible for a visit   [facility] [address] Emergency Department Emergency Medicine  If symptoms worsen [address] Emergency Department, [address], [place], [place], [address]          Discharge Medication List as of 5/5/2022  2:25 AM      CONTINUE these medications which have NOT CHANGED    Details   norethindrone-ethinyl estradiol (Junel FE 1/20) 1-20 MG-MCG per tablet Take 1 tablet by mouth daily, Starting Wed 12/1/2021, Normal             No discharge procedures on file      PDMP Review     None          ED Provider  Electronically Signed by           Manoj Hollis PA-C  05/05/22 5128

## 2022-05-06 ENCOUNTER — HOSPITAL ENCOUNTER (OUTPATIENT)
Dept: RADIOLOGY | Facility: MEDICAL CENTER | Age: 22
Discharge: HOME | End: 2022-05-06
Payer: COMMERCIAL

## 2022-05-06 DIAGNOSIS — B27.90 INFECTIOUS MONONUCLEOSIS, UNSPECIFIED WITHOUT COMPLICATION: ICD-10-CM

## 2022-05-06 PROCEDURE — 76705 ECHO EXAM OF ABDOMEN: CPT

## 2022-05-09 ENCOUNTER — TELEPHONE (OUTPATIENT)
Dept: INFECTIOUS DISEASES | Facility: CLINIC | Age: 22
End: 2022-05-09

## 2022-05-09 NOTE — TELEPHONE ENCOUNTER
Spoke with pt regarding referral informing her records have been reviewed by a provider in the office and still did not warrant an appt with ID  Reminded pt that as was discussed before that it is a viral infection and has to run it's course, rest, hydration are always recommended  Titers can remain elevated for awhile and doesn't indicate chronic mono  Pt stated that she understands that and still wants an appt as she is not feeling better and her doctor told her it is chronic mono requiring treatment  She needs to be seen  Informed her we can assist her getting scheduled but that appt more likely will be informational to which she was agreeable

## 2022-05-11 ENCOUNTER — OFFICE VISIT (OUTPATIENT)
Dept: URGENT CARE | Facility: MEDICAL CENTER | Age: 22
End: 2022-05-11
Payer: COMMERCIAL

## 2022-05-11 VITALS
SYSTOLIC BLOOD PRESSURE: 132 MMHG | HEIGHT: 64 IN | OXYGEN SATURATION: 99 % | RESPIRATION RATE: 18 BRPM | BODY MASS INDEX: 22.36 KG/M2 | TEMPERATURE: 98 F | WEIGHT: 130.95 LBS | HEART RATE: 100 BPM | DIASTOLIC BLOOD PRESSURE: 82 MMHG

## 2022-05-11 DIAGNOSIS — M54.9 UPPER BACK PAIN: Primary | ICD-10-CM

## 2022-05-11 PROCEDURE — 99213 OFFICE O/P EST LOW 20 MIN: CPT | Performed by: PHYSICIAN ASSISTANT

## 2022-05-11 RX ORDER — NAPROXEN 500 MG/1
500 TABLET ORAL 2 TIMES DAILY WITH MEALS
Qty: 20 TABLET | Refills: 0 | Status: SHIPPED | OUTPATIENT
Start: 2022-05-11 | End: 2022-06-24 | Stop reason: ALTCHOICE

## 2022-05-11 NOTE — PROGRESS NOTES
[facility]        NAME: Mckinley Soriano is a 22 y o  female  : 2000    MRN: [id number]  DATE: May 11, 2022  TIME: 6:33 PM    Assessment and Plan   Upper back pain [M54 9]  1  Upper back pain           Patient Instructions     Upper back pain   Naprosyn as directed  Follow up with PCP in 3-5 days  Proceed to  ER if symptoms worsen  Chief Complaint     Chief Complaint   Patient presents with    Shoulder Pain     B/l shoulder pain    Back Pain     B/l upper back pain; seen multiple times for the same pain and placed on two doses of steroids and seen in ED; patient states she continues to have pain but people continue to tell her "nothing is wrong with you"          History of Present Illness       81-year-old female who presents complaining of upper back pain  Patient has recently been seen in the emergency room where she had a CT scan and x-rays   Which were negative patient denies fevers, chills, nausea, vomiting, chest pain, shortness off breath      Review of Systems   Review of Systems   Constitutional: Negative  HENT: Negative  Eyes: Negative  Respiratory: Negative  Negative for cough, chest tightness, shortness of breath, wheezing and stridor  Cardiovascular: Negative  Negative for chest pain, palpitations and leg swelling  Current Medications       Current Outpatient Medications:     norethindrone-ethinyl estradiol (Junel FE 1/20) 1-20 MG-MCG per tablet, Take 1 tablet by mouth daily, Disp: 84 tablet, Rfl: 3    Current Allergies     Allergies as of 2022    (No Known Allergies)            The following portions of the patient's history were reviewed and updated as appropriate: allergies, current medications, past family history, past medical history, past social history, past surgical history and problem list      Past Medical History:   Diagnosis Date    Mononucleosis        History reviewed  No pertinent surgical history      Family History   Problem Relation Age of Onset   Liban Alu' disease Mother     Hypertension Father          Medications have been verified  Objective   /82   Pulse 100   Temp 98 °F (36 7 °C)   Resp 18   Ht 5' 4" (1 626 m)   Wt 59 4 kg (130 lb 15 3 oz)   LMP 04/24/2022 (Approximate)   SpO2 99%   BMI 22 48 kg/m²        Physical Exam     Physical Exam  Constitutional:       Appearance: She is well-developed  HENT:      Head: Normocephalic and atraumatic  Right Ear: External ear normal       Left Ear: External ear normal       Nose: Nose normal       Mouth/Throat:      Pharynx: No oropharyngeal exudate  Cardiovascular:      Rate and Rhythm: Normal rate and regular rhythm  Heart sounds: Normal heart sounds  Pulmonary:      Effort: Pulmonary effort is normal  No respiratory distress  Breath sounds: Normal breath sounds  No wheezing or rales  Chest:      Chest wall: No tenderness  Abdominal:      General: Bowel sounds are normal  There is no distension  Palpations: Abdomen is soft  There is no mass  Tenderness: There is no abdominal tenderness  There is no guarding or rebound  Musculoskeletal:      Cervical back: Normal, normal range of motion and neck supple  Thoracic back: Spasms present  No swelling, edema, deformity, signs of trauma, lacerations, tenderness or bony tenderness  Normal range of motion  No scoliosis  Lymphadenopathy:      Cervical: No cervical adenopathy

## 2022-05-11 NOTE — PATIENT INSTRUCTIONS
Upper back pain   Naprosyn as directed  Follow up with PCP in 3-5 days  Proceed to  ER if symptoms worsen

## 2022-05-16 ENCOUNTER — OFFICE VISIT (OUTPATIENT)
Dept: URGENT CARE | Facility: MEDICAL CENTER | Age: 22
End: 2022-05-16
Payer: COMMERCIAL

## 2022-05-16 VITALS
SYSTOLIC BLOOD PRESSURE: 134 MMHG | WEIGHT: 140 LBS | HEART RATE: 96 BPM | RESPIRATION RATE: 18 BRPM | TEMPERATURE: 98.7 F | HEIGHT: 64 IN | DIASTOLIC BLOOD PRESSURE: 89 MMHG | BODY MASS INDEX: 23.9 KG/M2 | OXYGEN SATURATION: 96 %

## 2022-05-16 DIAGNOSIS — R00.2 PALPITATIONS: ICD-10-CM

## 2022-05-16 DIAGNOSIS — R00.0 TACHYCARDIA: Primary | ICD-10-CM

## 2022-05-16 PROCEDURE — 93005 ELECTROCARDIOGRAM TRACING: CPT | Performed by: PHYSICIAN ASSISTANT

## 2022-05-16 PROCEDURE — 99213 OFFICE O/P EST LOW 20 MIN: CPT | Performed by: PHYSICIAN ASSISTANT

## 2022-05-16 NOTE — PATIENT INSTRUCTIONS
1  Increase oral fluids  2  Decrease any caffeine intake and nicotine use  3  Avoid recreational drugs  4  Be sure to get good rest   5  Go to the ER for any worsening symptoms  6  Follow-up with primary care within the next week

## 2022-05-16 NOTE — PROGRESS NOTES
[facility]        NAME: Elzbieta Stroud is a 22 y o  female  : 2000    MRN: [id number]  DATE: May 16, 2022  TIME: 7:43 PM    Assessment and Plan   Tachycardia [R00 0]  1  Tachycardia     2  Palpitations           Patient Instructions   1  Increase oral fluids  2  Decrease any caffeine intake and nicotine use  3  Avoid recreational drugs  4  Be sure to get good rest   5  Go to the ER for any worsening symptoms  6  Follow-up with primary care within the next week  Chief Complaint     Chief Complaint   Patient presents with    Rapid Heart Rate     Pt  Reports rapid heart rate for the past 4 days         History of Present Illness       72-year-old female with an ongoing intermittent history of tachycardia sensation and palpitations  Patient states that she had such an episode today which lasted for several minutes  She states that the symptoms have resolved prior to getting here  She has been seen in the ER for unrelated things and had a recent TSH resulted which was normal   She denies any chest pain, shortness of breath or any other associated symptoms  Review of Systems   Review of Systems   Constitutional: Negative for chills and fever  HENT: Negative for ear pain and sore throat  Eyes: Negative for pain and visual disturbance  Respiratory: Negative for cough and shortness of breath  Cardiovascular: Positive for palpitations  Negative for chest pain  Gastrointestinal: Negative for abdominal pain and vomiting  Genitourinary: Negative for dysuria and hematuria  Musculoskeletal: Negative for arthralgias and back pain  Skin: Negative for color change and rash  Neurological: Negative for dizziness, seizures and syncope  All other systems reviewed and are negative          Current Medications       Current Outpatient Medications:     norethindrone-ethinyl estradiol (Junel FE 1/20) 1-20 MG-MCG per tablet, Take 1 tablet by mouth daily, Disp: 84 tablet, Rfl: 3   naproxen (NAPROSYN) 500 mg tablet, Take 1 tablet (500 mg total) by mouth in the morning and 1 tablet (500 mg total) in the evening  Take with meals  Do all this for 10 days  (Patient not taking: Reported on 5/16/2022), Disp: 20 tablet, Rfl: 0    Current Allergies     Allergies as of 05/16/2022    (No Known Allergies)            The following portions of the patient's history were reviewed and updated as appropriate: allergies, current medications, past family history, past medical history, past social history, past surgical history and problem list      Past Medical History:   Diagnosis Date    Mononucleosis        No past surgical history on file  Family History   Problem Relation Age of Onset   Wade Americo' disease Mother     Hypertension Father          Medications have been verified  Objective   /89   Pulse 96   Temp 98 7 °F (37 1 °C)   Resp 18   Ht 5' 4" (1 626 m)   Wt 63 5 kg (140 lb)   LMP 04/24/2022 (Approximate)   SpO2 96%   BMI 24 03 kg/m²        Physical Exam     Physical Exam  Constitutional:       Appearance: Normal appearance  HENT:      Head: Normocephalic  Nose: Nose normal    Eyes:      Conjunctiva/sclera: Conjunctivae normal       Pupils: Pupils are equal, round, and reactive to light  Cardiovascular:      Rate and Rhythm: Normal rate and regular rhythm  Pulses: Normal pulses  Heart sounds: Normal heart sounds  Pulmonary:      Effort: Pulmonary effort is normal       Breath sounds: Normal breath sounds  Abdominal:      Tenderness: There is no abdominal tenderness  Musculoskeletal:         General: Normal range of motion  Cervical back: Normal range of motion  Skin:     General: Skin is warm and dry  Neurological:      General: No focal deficit present  Mental Status: She is alert and oriented to person, place, and time     Psychiatric:         Mood and Affect: Mood normal          Behavior: Behavior normal        Preliminary interpretation of EKG:   Normal sinus rhythm rate of 97  No acute ST or T-wave changes

## 2022-05-20 LAB
ATRIAL RATE: 97 BPM
P AXIS: 61 DEGREES
PR INTERVAL: 136 MS
QRS AXIS: 3 DEGREES
QRSD INTERVAL: 70 MS
QT INTERVAL: 340 MS
QTC INTERVAL: 431 MS
T WAVE AXIS: 59 DEGREES
VENTRICULAR RATE: 97 BPM

## 2022-05-20 PROCEDURE — 93010 ELECTROCARDIOGRAM REPORT: CPT | Performed by: INTERNAL MEDICINE

## 2022-05-24 ENCOUNTER — CONSULT (OUTPATIENT)
Dept: INFECTIOUS DISEASES | Facility: CLINIC | Age: 22
End: 2022-05-24
Payer: COMMERCIAL

## 2022-05-24 VITALS
TEMPERATURE: 97.9 F | OXYGEN SATURATION: 99 % | DIASTOLIC BLOOD PRESSURE: 74 MMHG | WEIGHT: 142.2 LBS | HEART RATE: 94 BPM | BODY MASS INDEX: 24.28 KG/M2 | HEIGHT: 64 IN | SYSTOLIC BLOOD PRESSURE: 114 MMHG | RESPIRATION RATE: 18 BRPM

## 2022-05-24 DIAGNOSIS — B27.00 GAMMAHERPESVIRAL MONONUCLEOSIS WITHOUT COMPLICATION: Primary | ICD-10-CM

## 2022-05-24 PROCEDURE — 99244 OFF/OP CNSLTJ NEW/EST MOD 40: CPT | Performed by: INTERNAL MEDICINE

## 2022-05-24 NOTE — PROGRESS NOTES
Consultation - Infectious Disease   Elzbieta Stroud 22 y o  female MRN: [id number]  Unit/Bed#:  Encounter: [id number]      IMPRESSION & RECOMMENDATIONS:   1  EBV associated mononucleosis  Patient formally diagnosed in January  Serological testing consistent with exposure in the past   Patient presented with systemic complaints and on other evaluations at urgent care/ED she had various times was noted to have lymphadenopathy  Recent imaging without splenomegaly  Patient however still with persistent systemic symptoms of fatigue, headache, disturbances and sleep  We had detailed discussion about shift in focus to supportive measures  No role for antiviral therapy  Would not recommend repeat testing  No need for repeat imaging at this point  Recommend evaluation by Nutrition, to optimize diet  Recommend initiation of exercise program/PT evaluation  Recommend evaluation by chiropractor or [facility] provider given headaches  Continue to follow with chronic providers such as GYN  Recommend re-engaging with care with [facility] services, with current situation  Continue regular screening otherwise with PCP  RTO PRN    Above plan discussed in detail with the patient and with her grandmother  Will forward office visit to primary care provider    I have spent 50 minutes with Patient and family today in which greater than 50% of this time was spent in counseling/coordination of care regarding Diagnostic results, Risks and benefits of tx options, Intructions for management, Patient and family education and Impressions  HISTORY OF PRESENT ILLNESS:  Reason for Consult:  Monosyndrome with EBV and persistent symptoms  HPI: Elzbieta Stroud is a 22y o  year old female with dysmenorrhea, prior bipolar with depressive symptoms but has been successfully off medication for many years  Patient is being referred to our office for concerns for persistent viral infection leading to persistent symptoms    Patient was diagnosed with mono formally in January by her primary care provider  Unfortunately only labs available from that time and no office visits  She reported essentially having body aches, fatigue, headaches, issues with sleep, swollen lymph nodes and feelings of a swollen spleen  Since that time she has been seen at urgent care visits and in the emergency department for various episodes of recurrence of symptoms  Recent images reviewed including CT scan and ultrasound which do not show any persistent lymphadenopathy or splenomegaly  Patient reports that she remains on light duty at work  There was a period of time that she was essentially in her bed and had no energy with persistent fatigue  She is sexually active with only 1 partner  She reports previous testing for HIV and hepatitis were unremarkable and has no concerns requiring repeat testing at this time  Denies other STI history  Patient reports working at [employer] and has not attended school  She does have a new tattoo  Patient reports having a dog but denies having any exotic pets or animals or exposure to farm animals  She denies ingesting raw meat/fish on a regular basis  Denies any progressive or persistent GI symptoms  Patient provided list of organized questions along with symptoms  Grandmother present for our evaluation  I reviewed with them the patient's prior labs indicating exposure to EBV  We discussed in detail the latency associated with EBV integration into the immune system  We discussed that this is a virus that can essentially remain dormant with episodes of reactivation during times of stress  We also discussed the possibility of persistent symptoms from prior infection  I reviewed with them in detail that there is no active role for antiviral therapy and that is generally reserved for transplant population    I discussed with them that going forward it would be of benefit to seek supportive measures that could potentially improve patient's overall day-to-day function  We discussed that there is no role for repeat testing  We discussed seeking a nutrition evaluation to optimize diet  We discussed seeking out things like chiropractor/OM[facility] provider as the patient is reporting headaches which may improve with adjustments  We also discussed nontraditional therapies such as acupuncture and massage  Lastly we talked about regular exercise/starting an exercise regimen or even seeing folks like physical therapy  I stressed to the patient that her symptoms may have truly been from prior illness and unfortunately she may have relapses or recurrences in the future or infections with other viruses which can lead to similar symptoms  She expressed understanding and her grandmother expressed the same  Lastly I did stress to her about considering other diagnoses/issues that may be contributing  I recommended that she continue to follow with her GYN provider  I also discussed with her about re-engaging with mental health providers  Her current episode of illness may be leading to some situational depression which she could benefit from therapy or possibly medications for brief period  She expressed understanding  I let her know that we will send these recommendations to her primary  Additionally we discussed at the moment no formal follow-up is required in the ID office but she can return if there are further questions from her primary provider  REVIEW OF SYSTEMS:  A complete 12 point system-based review of systems is otherwise negative  PAST MEDICAL HISTORY:  Past Medical History:   Diagnosis Date    Mononucleosis      No past surgical history on file      FAMILY HISTORY:  Non-contributory    SOCIAL HISTORY:  Social History   Social History     Substance and Sexual Activity   Alcohol Use Not Currently    Comment: rarely     Social History     Substance and Sexual Activity   Drug Use Not Currently    Types: Marijuana    Comment: socially     Social History     Tobacco Use   Smoking Status Former Smoker    Types: Cigarettes   Smokeless Tobacco Never Used   Tobacco Comment    quit 2-3 years       ALLERGIES:  No Known Allergies    MEDICATIONS:  All current active medications have been reviewed  Antibiotics:    PHYSICAL EXAM:  Vitals:    05/24/22 1241   BP: 114/74   BP Location: Right arm   Patient Position: Sitting   Cuff Size: Adult   Pulse: 94   Resp: 18   Temp: 97 9 °F (36 6 °C)   TempSrc: Temporal   SpO2: 99%   Weight: 64 5 kg (142 lb 3 2 oz)   Height: 5' 4" (1 626 m)         General Appearance:  Appearing well, nontoxic, and in no distress; patient is attentive and is able to provide accurate history  Head:  Normocephalic, without obvious abnormality, atraumatic   Eyes:  Conjunctiva pink and sclera anicteric, both eyes   Nose: Nares normal, mucosa normal, no drainage   Throat: Oropharynx moist without lesions; no tonsillar enlargement appreciated   Neck: Supple, symmetrical, no tenderness/mass/nodules; a few subcentimeter lymph nodes palpated under the right submandibular area which are largely nontender  Back:   Symmetric, no curvature, ROM normal, no CVA tenderness   Lungs:   Clear to auscultation bilaterally, respirations unlabored   Chest Wall:  No tenderness or deformity   Heart:  RRR; no murmur, rub or gallop   Abdomen:   Soft, non-tender, non-distended, positive bowel sounds   Extremities: No cyanosis, clubbing or edema   Skin: No rashes or lesions  No draining wounds noted  Lymph nodes: Cervical, supraclavicular nodes normal   Neurologic: Alert and oriented times 3, patient was able to ambulate unassisted  Freely moving her extremities against gravity  LABS, IMAGING, & OTHER STUDIES:  Lab Results:  I have personally reviewed pertinent labs    Lab Results   Component Value Date    K 4 0 05/04/2022     05/04/2022    CO2 24 05/04/2022    BUN 15 05/04/2022    CREATININE 0 74 05/04/2022    CALCIUM 9 3 05/04/2022    AST 17 05/04/2022 ALT 43 05/04/2022    ALKPHOS 44 05/04/2022    EGFR 116 05/04/2022     Lab Results   Component Value Date    WBC 14 03 (H) 05/04/2022    HGB 14 6 05/04/2022    HCT 43 9 05/04/2022    MCV 98 05/04/2022     (H) 05/04/2022   No results found for: SEDRATE      Imaging Studies:   I have personally reviewed pertinent imaging study reports and images in PACS  Other Studies:   I have personally reviewed pertinent reports

## 2022-05-31 ENCOUNTER — TELEPHONE (OUTPATIENT)
Dept: INFECTIOUS DISEASES | Facility: CLINIC | Age: 22
End: 2022-05-31

## 2022-05-31 NOTE — TELEPHONE ENCOUNTER
Contacted patient PCP as I have faxed this to their office  I spoke with aHrini who confirmed she did receive this and does not have any questions

## 2022-06-12 ENCOUNTER — APPOINTMENT (EMERGENCY)
Dept: RADIOLOGY | Facility: HOSPITAL | Age: 22
End: 2022-06-12
Payer: COMMERCIAL

## 2022-06-12 ENCOUNTER — HOSPITAL ENCOUNTER (EMERGENCY)
Facility: HOSPITAL | Age: 22
Discharge: HOME | End: 2022-06-13
Attending: EMERGENCY MEDICINE
Payer: COMMERCIAL

## 2022-06-12 ENCOUNTER — APPOINTMENT (EMERGENCY)
Dept: CT IMAGING | Facility: HOSPITAL | Age: 22
End: 2022-06-12
Payer: COMMERCIAL

## 2022-06-12 VITALS
BODY MASS INDEX: 25.2 KG/M2 | DIASTOLIC BLOOD PRESSURE: 65 MMHG | WEIGHT: 146.83 LBS | TEMPERATURE: 98.5 F | RESPIRATION RATE: 16 BRPM | SYSTOLIC BLOOD PRESSURE: 108 MMHG | HEART RATE: 88 BPM | OXYGEN SATURATION: 98 %

## 2022-06-12 DIAGNOSIS — R10.12 LEFT UPPER QUADRANT ABDOMINAL PAIN: Primary | ICD-10-CM

## 2022-06-12 LAB
ALBUMIN SERPL BCP-MCNC: 4.5 G/DL (ref 3.5–5)
ALP SERPL-CCNC: 49 U/L (ref 34–104)
ALT SERPL W P-5'-P-CCNC: 17 U/L (ref 7–52)
ANION GAP SERPL CALCULATED.3IONS-SCNC: 8 MMOL/L (ref 4–13)
AST SERPL W P-5'-P-CCNC: 16 U/L (ref 13–39)
BACTERIA UR QL AUTO: NORMAL /HPF
BASOPHILS # BLD AUTO: 0.02 THOUSANDS/ΜL (ref 0–0.1)
BASOPHILS NFR BLD AUTO: 0 % (ref 0–1)
BILIRUB SERPL-MCNC: 0.38 MG/DL (ref 0.2–1)
BILIRUB UR QL STRIP: NEGATIVE
BUN SERPL-MCNC: 10 MG/DL (ref 5–25)
CALCIUM SERPL-MCNC: 9.3 MG/DL (ref 8.4–10.2)
CARDIAC TROPONIN I PNL SERPL HS: <2 NG/L
CHLORIDE SERPL-SCNC: 104 MMOL/L (ref 96–108)
CLARITY UR: CLEAR
CO2 SERPL-SCNC: 26 MMOL/L (ref 21–32)
COLOR UR: COLORLESS
CREAT SERPL-MCNC: 0.8 MG/DL (ref 0.6–1.3)
D DIMER PPP FEU-MCNC: 0.35 UG/ML FEU
EOSINOPHIL # BLD AUTO: 0 THOUSAND/ΜL (ref 0–0.61)
EOSINOPHIL NFR BLD AUTO: 0 % (ref 0–6)
ERYTHROCYTE [DISTWIDTH] IN BLOOD BY AUTOMATED COUNT: 11.9 % (ref 11.6–15.1)
EXT PREG TEST URINE: NEGATIVE
EXT. CONTROL ED NAV: NORMAL
GFR SERPL CREATININE-BSD FRML MDRD: 106 ML/MIN/1.73SQ M
GLUCOSE SERPL-MCNC: 89 MG/DL (ref 65–140)
GLUCOSE UR STRIP-MCNC: NEGATIVE MG/DL
HCT VFR BLD AUTO: 42.5 % (ref 34.8–46.1)
HGB BLD-MCNC: 14.6 G/DL (ref 11.5–15.4)
HGB UR QL STRIP.AUTO: NEGATIVE
IMM GRANULOCYTES # BLD AUTO: 0.04 THOUSAND/UL (ref 0–0.2)
IMM GRANULOCYTES NFR BLD AUTO: 1 % (ref 0–2)
KETONES UR STRIP-MCNC: NEGATIVE MG/DL
LACTATE SERPL-SCNC: 0.7 MMOL/L (ref 0.5–2)
LEUKOCYTE ESTERASE UR QL STRIP: ABNORMAL
LIPASE SERPL-CCNC: 13 U/L (ref 11–82)
LYMPHOCYTES # BLD AUTO: 2.48 THOUSANDS/ΜL (ref 0.6–4.47)
LYMPHOCYTES NFR BLD AUTO: 35 % (ref 14–44)
MCH RBC QN AUTO: 33 PG (ref 26.8–34.3)
MCHC RBC AUTO-ENTMCNC: 34.4 G/DL (ref 31.4–37.4)
MCV RBC AUTO: 96 FL (ref 82–98)
MONOCYTES # BLD AUTO: 0.62 THOUSAND/ΜL (ref 0.17–1.22)
MONOCYTES NFR BLD AUTO: 9 % (ref 4–12)
NEUTROPHILS # BLD AUTO: 3.99 THOUSANDS/ΜL (ref 1.85–7.62)
NEUTS SEG NFR BLD AUTO: 55 % (ref 43–75)
NITRITE UR QL STRIP: NEGATIVE
NON-SQ EPI CELLS URNS QL MICRO: NORMAL /HPF
NRBC BLD AUTO-RTO: 0 /100 WBCS
PH UR STRIP.AUTO: 6 [PH]
PLATELET # BLD AUTO: 311 THOUSANDS/UL (ref 149–390)
PMV BLD AUTO: 9.6 FL (ref 8.9–12.7)
POTASSIUM SERPL-SCNC: 3.5 MMOL/L (ref 3.5–5.3)
PROT SERPL-MCNC: 7.3 G/DL (ref 6.4–8.4)
PROT UR STRIP-MCNC: NEGATIVE MG/DL
RBC # BLD AUTO: 4.42 MILLION/UL (ref 3.81–5.12)
RBC #/AREA URNS AUTO: NORMAL /HPF
SODIUM SERPL-SCNC: 138 MMOL/L (ref 135–147)
SP GR UR STRIP.AUTO: <=1.005 (ref 1–1.03)
TSH SERPL DL<=0.05 MIU/L-ACNC: 2.28 UIU/ML (ref 0.45–4.5)
UROBILINOGEN UR QL STRIP.AUTO: 0.2 E.U./DL
WBC # BLD AUTO: 7.15 THOUSAND/UL (ref 4.31–10.16)
WBC #/AREA URNS AUTO: NORMAL /HPF

## 2022-06-12 PROCEDURE — 81025 URINE PREGNANCY TEST: CPT | Performed by: PHYSICIAN ASSISTANT

## 2022-06-12 PROCEDURE — 99285 EMERGENCY DEPT VISIT HI MDM: CPT | Performed by: PHYSICIAN ASSISTANT

## 2022-06-12 PROCEDURE — 74176 CT ABD & PELVIS W/O CONTRAST: CPT

## 2022-06-12 PROCEDURE — 83605 ASSAY OF LACTIC ACID: CPT | Performed by: PHYSICIAN ASSISTANT

## 2022-06-12 PROCEDURE — 80053 COMPREHEN METABOLIC PANEL: CPT | Performed by: PHYSICIAN ASSISTANT

## 2022-06-12 PROCEDURE — 83690 ASSAY OF LIPASE: CPT | Performed by: PHYSICIAN ASSISTANT

## 2022-06-12 PROCEDURE — 93005 ELECTROCARDIOGRAM TRACING: CPT

## 2022-06-12 PROCEDURE — 81001 URINALYSIS AUTO W/SCOPE: CPT | Performed by: PHYSICIAN ASSISTANT

## 2022-06-12 PROCEDURE — G1004 CDSM NDSC: HCPCS

## 2022-06-12 PROCEDURE — 96361 HYDRATE IV INFUSION ADD-ON: CPT

## 2022-06-12 PROCEDURE — 85025 COMPLETE CBC W/AUTO DIFF WBC: CPT | Performed by: PHYSICIAN ASSISTANT

## 2022-06-12 PROCEDURE — 96374 THER/PROPH/DIAG INJ IV PUSH: CPT

## 2022-06-12 PROCEDURE — 85379 FIBRIN DEGRADATION QUANT: CPT | Performed by: PHYSICIAN ASSISTANT

## 2022-06-12 PROCEDURE — 84443 ASSAY THYROID STIM HORMONE: CPT | Performed by: PHYSICIAN ASSISTANT

## 2022-06-12 PROCEDURE — 71045 X-RAY EXAM CHEST 1 VIEW: CPT

## 2022-06-12 PROCEDURE — 99285 EMERGENCY DEPT VISIT HI MDM: CPT

## 2022-06-12 PROCEDURE — 36415 COLL VENOUS BLD VENIPUNCTURE: CPT | Performed by: PHYSICIAN ASSISTANT

## 2022-06-12 PROCEDURE — 84484 ASSAY OF TROPONIN QUANT: CPT | Performed by: PHYSICIAN ASSISTANT

## 2022-06-12 RX ORDER — KETOROLAC TROMETHAMINE 30 MG/ML
15 INJECTION, SOLUTION INTRAMUSCULAR; INTRAVENOUS ONCE
Status: COMPLETED | OUTPATIENT
Start: 2022-06-12 | End: 2022-06-12

## 2022-06-12 RX ORDER — SUCRALFATE 1 G/1
1 TABLET ORAL ONCE
Status: COMPLETED | OUTPATIENT
Start: 2022-06-12 | End: 2022-06-12

## 2022-06-12 RX ORDER — SUCRALFATE 1 G/1
1 TABLET ORAL 4 TIMES DAILY
Qty: 20 TABLET | Refills: 0 | Status: SHIPPED | OUTPATIENT
Start: 2022-06-12 | End: 2022-06-24 | Stop reason: ALTCHOICE

## 2022-06-12 RX ADMIN — SODIUM CHLORIDE 1000 ML: 0.9 INJECTION, SOLUTION INTRAVENOUS at 20:23

## 2022-06-12 RX ADMIN — KETOROLAC TROMETHAMINE 15 MG: 30 INJECTION, SOLUTION INTRAMUSCULAR at 21:19

## 2022-06-12 RX ADMIN — SUCRALFATE 1 G: 1 TABLET ORAL at 20:49

## 2022-06-13 LAB
ATRIAL RATE: 85 BPM
ATRIAL RATE: 86 BPM
P AXIS: 62 DEGREES
P AXIS: 63 DEGREES
PR INTERVAL: 136 MS
PR INTERVAL: 138 MS
QRS AXIS: -1 DEGREES
QRS AXIS: 0 DEGREES
QRSD INTERVAL: 76 MS
QRSD INTERVAL: 78 MS
QT INTERVAL: 338 MS
QT INTERVAL: 344 MS
QTC INTERVAL: 402 MS
QTC INTERVAL: 411 MS
T WAVE AXIS: 47 DEGREES
T WAVE AXIS: 59 DEGREES
VENTRICULAR RATE: 85 BPM
VENTRICULAR RATE: 86 BPM

## 2022-06-13 PROCEDURE — 93010 ELECTROCARDIOGRAM REPORT: CPT | Performed by: INTERNAL MEDICINE

## 2022-06-13 NOTE — ED PROVIDER NOTES
History  Chief Complaint   Patient presents with    Abdominal Pain     Pt with LUQ pain since last night that radiates to left shoulder  Had mono in January with swollen spleen, states it had gotten better recently but pain is concerns her that it is acting up again  Also was trying to rule out with ID doc whether she has chronic harvey barr virus  Patient is a 22 YOF presenting to the ER for evaluation of left upper quadrant pain radiating to her left shoulder  She states it started last night while she was in bed  States it is constant and sharp  States certain movements may sometimes make it worse  She denies any chest pain, shortness of breath, nausea, vomiting, diarrhea, dysuria, pelvic pain, vaginal discharge  Of note, patient was diagnosed with Mono at the end of January and has been seeing her PCP and infectious disease for this  She states she was diagnosed with splenomegaly at this time and is concerned it is still enlarged  History provided by:  Patient   used: No        Prior to Admission Medications   Prescriptions Last Dose Informant Patient Reported? Taking?   naproxen (NAPROSYN) 500 mg tablet   No No   Sig: Take 1 tablet (500 mg total) by mouth in the morning and 1 tablet (500 mg total) in the evening  Take with meals  Do all this for 10 days  Patient not taking: Reported on 5/16/2022   norethindrone-ethinyl estradiol (Junel FE 1/20) 1-20 MG-MCG per tablet   No Yes   Sig: Take 1 tablet by mouth daily      Facility-Administered Medications: None       Past Medical History:   Diagnosis Date    Mononucleosis        History reviewed  No pertinent surgical history  Family History   Problem Relation Age of Onset   [place] Buys' disease Mother     Hypertension Father      I have reviewed and agree with the history as documented      E-Cigarette/Vaping    E-Cigarette Use Current Every Day User      E-Cigarette/Vaping Substances    Nicotine Yes     THC No     CBD No     Flavoring Yes     Other No     Unknown No      Social History     Tobacco Use    Smoking status: Former Smoker     Types: Cigarettes    Smokeless tobacco: Never Used    Tobacco comment: quit 2-3 years   Vaping Use    Vaping Use: Every day    Substances: Nicotine, Flavoring   Substance Use Topics    Alcohol use: Not Currently     Comment: rarely    Drug use: Not Currently     Types: Marijuana     Comment: socially       Review of Systems   Constitutional: Negative for chills and fever  HENT: Negative for ear pain and sore throat  Eyes: Negative for pain and visual disturbance  Respiratory: Negative for cough and shortness of breath  Cardiovascular: Negative for chest pain and palpitations  Gastrointestinal: Positive for abdominal pain  Negative for vomiting  Genitourinary: Negative for dysuria and hematuria  Musculoskeletal: Negative for arthralgias and back pain  Skin: Negative for color change and rash  Neurological: Negative for seizures and syncope  All other systems reviewed and are negative  Physical Exam  Physical Exam  Vitals and nursing note reviewed  Constitutional:       General: She is not in acute distress  Appearance: She is well-developed  Comments: Resting comfortably in NAD   HENT:      Head: Normocephalic and atraumatic  Eyes:      Conjunctiva/sclera: Conjunctivae normal    Cardiovascular:      Rate and Rhythm: Normal rate and regular rhythm  Heart sounds: No murmur heard  Pulmonary:      Effort: Pulmonary effort is normal  No respiratory distress  Breath sounds: Normal breath sounds  Abdominal:      Palpations: Abdomen is soft  Tenderness: There is abdominal tenderness in the left upper quadrant  Musculoskeletal:      Cervical back: Neck supple  Comments: Minimal tenderness to left anterolateral ribs   Skin:     General: Skin is warm and dry  Neurological:      Mental Status: She is alert           Vital Signs  ED Triage Vitals [06/12/22 1928]   Temperature Pulse Respirations Blood Pressure SpO2   98 5 °F (36 9 °C) 90 16 (!) 172/82 100 %      Temp src Heart Rate Source Patient Position - Orthostatic VS BP Location FiO2 (%)   -- Monitor Sitting Right arm --      Pain Score       7           Vitals:    06/12/22 1928 06/12/22 2048 06/12/22 2357   BP: (!) 172/82 116/73 108/65   Pulse: 90 86 88   Patient Position - Orthostatic VS: Sitting  Lying         ED Medications  Medications   ketorolac (TORADOL) injection 15 mg (15 mg Intravenous Given 6/12/22 2119)   sodium chloride 0 9 % bolus 1,000 mL (0 mL Intravenous Stopped 6/12/22 2247)   sucralfate (CARAFATE) tablet 1 g (1 g Oral Given 6/12/22 2049)       Diagnostic Studies  Results Reviewed     Procedure Component Value Units Date/Time    TSH, 3rd generation with Free T4 reflex [[id number]]  (Normal) Collected: 06/12/22 2024    Lab Status: Final result Specimen: Blood from Arm, Right Updated: 06/12/22 2141     TSH 3RD GENERATON 2 276 uIU/mL     Narrative:      Patients undergoing fluorescein dye angiography may retain small amounts of fluorescein in the body for 48-72 hours post procedure  Samples containing fluorescein can produce falsely depressed TSH values  If the patient had this procedure,a specimen should be resubmitted post fluorescein clearance        Urine Microscopic [[id number]]  (Normal) Collected: 06/12/22 2110    Lab Status: Final result Specimen: Urine, Clean Catch Updated: 06/12/22 2127     RBC, UA None Seen /hpf      WBC, UA 0-1 /hpf      Epithelial Cells Occasional /hpf      Bacteria, UA Occasional /hpf     UA (URINE) with reflex to Scope [[id number]]  (Abnormal) Collected: 06/12/22 2110    Lab Status: Final result Specimen: Urine, Clean Catch Updated: 06/12/22 2121     Color, UA Colorless     Clarity, UA Clear     Specific Gravity, UA <=1 005     pH, UA 6 0     Leukocytes, UA Trace     Nitrite, UA Negative     Protein, UA Negative mg/dl      Glucose, UA Negative mg/dl Ketones, UA Negative mg/dl      Urobilinogen, UA 0 2 E U /dl      Bilirubin, UA Negative     Blood, UA Negative    POCT pregnancy, urine [[id number]]  (Normal) Resulted: 06/12/22 2114    Lab Status: Final result Updated: 06/12/22 2114     EXT PREG TEST UR (Ref: Negative) negative     Control valid    HS Troponin 0hr (reflex protocol) [[id number]]  (Normal) Collected: 06/12/22 2024    Lab Status: Final result Specimen: Blood from Arm, Right Updated: 06/12/22 2110     hs TnI 0hr <2 ng/L     Lipase [[id number]]  (Normal) Collected: 06/12/22 2024    Lab Status: Final result Specimen: Blood from Arm, Right Updated: 06/12/22 2106     Lipase 13 u/L     Comprehensive metabolic panel [[id number]] Collected: 06/12/22 2024    Lab Status: Final result Specimen: Blood from Arm, Right Updated: 06/12/22 2106     Sodium 138 mmol/L      Potassium 3 5 mmol/L      Chloride 104 mmol/L      CO2 26 mmol/L      ANION GAP 8 mmol/L      BUN 10 mg/dL      Creatinine 0 80 mg/dL      Glucose 89 mg/dL      Calcium 9 3 mg/dL      AST 16 U/L      ALT 17 U/L      Alkaline Phosphatase 49 U/L      Total Protein 7 3 g/dL      Albumin 4 5 g/dL      Total Bilirubin 0 38 mg/dL      eGFR 106 ml/min/1 73sq m     Narrative:      Jadiel guidelines for Chronic Kidney Disease (CKD):     Stage 1 with normal or high GFR (GFR > 90 mL/min/1 73 square meters)    Stage 2 Mild CKD (GFR = 60-89 mL/min/1 73 square meters)    Stage 3A Moderate CKD (GFR = 45-59 mL/min/1 73 square meters)    Stage 3B Moderate CKD (GFR = 30-44 mL/min/1 73 square meters)    Stage 4 Severe CKD (GFR = 15-29 mL/min/1 73 square meters)    Stage 5 End Stage CKD (GFR <15 mL/min/1 73 square meters)  Note: GFR calculation is accurate only with a steady state creatinine    Lactic acid, plasma [[id number]]  (Normal) Collected: 06/12/22 2028    Lab Status: Final result Specimen: Blood from Arm, Right Updated: 06/12/22 2105     LACTIC ACID 0 7 mmol/L     Narrative: Result may be elevated if tourniquet was used during collection  D-dimer, quantitative [[id number]]  (Normal) Collected: 06/12/22 2024    Lab Status: Final result Specimen: Blood from Arm, Right Updated: 06/12/22 2058     D-Dimer, Quant 0 35 ug/ml FEU     CBC and differential [[id number]] Collected: 06/12/22 2024    Lab Status: Final result Specimen: Blood from Arm, Right Updated: 06/12/22 2034     WBC 7 15 Thousand/uL      RBC 4 42 Million/uL      Hemoglobin 14 6 g/dL      Hematocrit 42 5 %      MCV 96 fL      MCH 33 0 pg      MCHC 34 4 g/dL      RDW 11 9 %      MPV 9 6 fL      Platelets 862 Thousands/uL      nRBC 0 /100 WBCs      Neutrophils Relative 55 %      Immat GRANS % 1 %      Lymphocytes Relative 35 %      Monocytes Relative 9 %      Eosinophils Relative 0 %      Basophils Relative 0 %      Neutrophils Absolute 3 99 Thousands/µL      Immature Grans Absolute 0 04 Thousand/uL      Lymphocytes Absolute 2 48 Thousands/µL      Monocytes Absolute 0 62 Thousand/µL      Eosinophils Absolute 0 00 Thousand/µL      Basophils Absolute 0 02 Thousands/µL                  CT abdomen pelvis wo contrast   ED Interpretation by Gómez Grossman PA-C (06/13 0001)   PROCEDURE INFORMATION:  Exam: CT Abdomen And Pelvis Without Contrast  Exam date and time: 6/12/2022 9:29 PM  Age: 22years old  Clinical indication: Abdominal pain; Localized; Left upper quadrant (luq); Patient HX: HX mono  TECHNIQUE:  Imaging protocol: Computed tomography of the abdomen and pelvis without contrast   COMPARISON:  CT CHEST ABDOMEN PELVIS W CONTRAST 5/5/2022 12:06 AM  FINDINGS:  Liver: Normal  No mass  Gallbladder and bile ducts: Normal  No calcified stones  No ductal dilation  Pancreas: Normal  No ductal dilation  Spleen: Normal  No splenomegaly  Adrenal glands: Normal  No mass  Kidneys and ureters: Normal  No hydronephrosis  Stomach and bowel: Dilated stomach with retained fluid and food material  No obstruction   No  mucosal thickening  Appendix: No evidence of appendicitis  Intraperitoneal space: Unremarkable  No free air  No significant fluid collection  Vasculature: Unremarkable  No abdominal aortic aneurysm  Lymph nodes: Unremarkable  No enlarged lymph    nodes  Urinary bladder: Unremarkable as visualized  Reproductive: Unremarkable as visualized  Bones/joints: Unremarkable  No acute fracture  Soft tissues: Unremarkable  IMPRESSION:  No acute findings  No bowel obstruction  No hydronephrosis seen bilaterally        XR chest 1 view portable   ED Interpretation by Gómez Grossman PA-C (06/12 2038)   No acute findings                  Procedures  ECG 12 Lead Documentation Only    Date/Time: 6/12/2022 8:47 PM  Performed by: Gómez Grossman PA-C  Authorized by: Gómez Grossman PA-C     ECG reviewed by me, the ED Provider: yes    Patient location:  ED  Rate:     ECG rate:  86    ECG rate assessment: normal    Rhythm:     Rhythm: sinus rhythm    Ectopy:     Ectopy: none    QRS:     QRS axis:  Normal  Conduction:     Conduction: normal    ST segments:     ST segments:  Normal  T waves:     T waves: non-specific    ECG 12 Lead Documentation Only    Date/Time: 6/12/2022 11:56 PM  Performed by: Gómez Grossman PA-C  Authorized by: Gómez Grossman PA-C     ECG reviewed by me, the ED Provider: yes    Patient location:  ED  Rate:     ECG rate:  85    ECG rate assessment: normal    Rhythm:     Rhythm: sinus rhythm    Ectopy:     Ectopy: none    QRS:     QRS axis:  Normal  Conduction:     Conduction: normal    ST segments:     ST segments:  Normal  T waves:     T waves: non-specific               ED Course  ED Course as of 06/13/22 0011   [place] Jun 12, 2022   2100 Per Radiology, there is a national shortage of IV contrast, all CT scans are to be ordered without contrast at this time unless otherwise indicated by radiologist for emergent situations including stroke alerts, rule out aortic dissection, trauma alerts, or high suspicion for pulmonary embolism  Will order CT without contrast at this time  1051 Patient feels much better at this time  Eating and drinking in the ER  Advised patient we are waiting for CT results, as long as they are normal, will discharge patient to follow up outpatient    Mon Jun 13, 2022   0003 Re-evaluation at bedside, patient feels better, would like to go home  Updated patient on labs and imaging results  Counseling as below  The appropriate recommended follow up and warning signs for return to the nearest ED were explained  patient's questions answered; patient is competent and reliable, verbalized understanding of all that was explained, and agrees with the disposition and further plan of treatment  patient was additionally provided with detailed follow up care instructions, phone numbers to call within our institution for other concerns or inquiries  Advised to follow up with PCP and GI  Patient tolerated PO in the ER  Pt is in no acute distress and is stable for discharge at this time                HEART Risk Score    Flowsheet Row Most Recent Value   Heart Score Risk Calculator    History 0 Filed at: 06/12/2022 2131   ECG 1 Filed at: 06/12/2022 2131   Age 0 Filed at: 06/12/2022 2131   Risk Factors 0 Filed at: 06/12/2022 2131   Troponin 0 Filed at: 06/12/2022 2131   HEART Score 1 Filed at: 06/12/2022 2131                PERC Rule for PE    Flowsheet Row Most Recent Value   PERC Rule for PE    Age >=50 0 Filed at: 06/12/2022 2018   HR >=100 0 Filed at: 06/12/2022 2018   O2 Sat on room air < 95% 0 Filed at: 06/12/2022 2018   History of PE or DVT 0 Filed at: 06/12/2022 2018   Recent trauma or surgery 0 Filed at: 06/12/2022 2018   Hemoptysis 0 Filed at: 06/12/2022 2018   Exogenous estrogen 1 Filed at: 06/12/2022 2018   Unilateral leg swelling 0 Filed at: 06/12/2022 2018   PERC Rule for PE Results 1 Filed at: 06/12/2022 2018                  Placido Friend' Criteria for PE    Flowsheet Row Most Recent Value   Wells' Criteria for PE    Clinical signs and symptoms of DVT 0 Filed at: 06/12/2022 2018   PE is primary diagnosis or equally likely 0 Filed at: 06/12/2022 2018   HR >100 0 Filed at: 06/12/2022 2018   Immobilization at least 3 days or Surgery in the previous 4 weeks 0 Filed at: 06/12/2022 2018   Previous, objectively diagnosed PE or DVT 0 Filed at: 06/12/2022 2018   Hemoptysis 0 Filed at: 06/12/2022 2018   Malignancy with treatment within 6 months or palliative --   Wells' Criteria Total 0 Filed at: 06/12/2022 2018                [facility]  Number of Diagnoses or Management Options  Left upper quadrant abdominal pain  Diagnosis management comments: Patient coming in with LUQ pain since last night  Minimal LUQ tenderness on exam, abdomen is soft  All labs normal  Patient tolerating PO  Feels relief after toradol and carafate  CTshowing dilated stomach with food contents  Patient was made aware of these findings  Was given GI follow up  Advised to follow up with PCP, return to the ER if anything acutely changes  No pulsatile abdominal mass to suggest AAA  No Point RLQ tenderness to suggest appy  No pain out of proportion to suggest ischemic colitis  No reported vomiting or diarrhea  No reported dysuria or hematuria to suggest pyelo  No reported chest pain, pleuritic chest pain or shortness of breath  No hematemesis/melena/hematochezia reported  Able to tolerate PO  Still passing gas/stools  No reported pulmonary symptoms  No tachypnea  No suprapubic or CVA tenderness  No fever/ruq pain/jaundice          Amount and/or Complexity of Data Reviewed  Clinical lab tests: ordered and reviewed  Tests in the radiology section of CPT®: ordered and reviewed    Patient Progress  Patient progress: stable      Disposition  Final diagnoses:   Left upper quadrant abdominal pain     Time reflects when diagnosis was documented in both MDM as applicable and the Disposition within this note     Time User Action Codes Description Comment    6/12/2022 11:49 PM Sandy Trujilloy Add [R10 12] Left upper quadrant abdominal pain     6/13/2022 12:03 AM Sandy Sly Add [J95 09] Dilated tracheostomy stoma ([address] )     6/13/2022 12:03 AM Sandy Trujilloy Remove [J95 09] Dilated tracheostomy stoma [facility])       ED Disposition     ED Disposition   Discharge    Condition   Stable    Date/Time   Mon Jun 13, 2022 12:01 AM    Comment   Jeannette Bae discharge to home/self care  Follow-up Information     Follow up With Specialties Details Why Contact Info Additional Information    Juliette Walker MD Pediatrics Schedule an appointment as soon as possible for a visit   [address] [facility]  [address] Emergency Department Emergency Medicine  If symptoms worsen [address] [facility] [address] [facility] Emergency Department, [address], [place], [place], [address] [facility] Schedule an appointment as soon as possible for a visit   [address] [facility] 85 [phone] [facility] [place], [address] [facility], [address], [place], [place], [phone]          Discharge Medication List as of 6/13/2022 12:01 AM      START taking these medications    Details   sucralfate (CARAFATE) 1 g tablet Take 1 tablet (1 g total) by mouth 4 (four) times a day for 5 days, Starting Sun 6/12/2022, Until Fri 6/17/2022, Normal         CONTINUE these medications which have NOT CHANGED    Details   norethindrone-ethinyl estradiol (Junel FE 1/20) 1-20 MG-MCG per tablet Take 1 tablet by mouth daily, Starting Wed 12/1/2021, Normal      naproxen (NAPROSYN) 500 mg tablet Take 1 tablet (500 mg total) by mouth in the morning and 1 tablet (500 mg total) in the evening   Take with meals  Do all this for 10 days  , Starting Wed 5/11/2022, Until Sat 5/21/2022, Normal             No discharge procedures on file      PDMP Review     None          ED Provider  Electronically Signed by           Mo Norton PA-C  06/13/22 0011

## 2022-06-13 NOTE — DISCHARGE INSTRUCTIONS
Follow up with PCP  Follow up with GI doctor  Return to the ER if anything acutely changes   Take carafate as directed 89 y/o F with PMH of dementia, HTN, HLD, Hypothyroidism, MDD, Constipation, Breast Cancer (s/p lymph node removal and radiation ~30 years ago), Takayasu Arteritis presented to ED by EMS w/ home health aide after a fall at home the previous night.     Patient confused and irritable though does not appear to have AH/VH, tremors, diaphoresis, n/v etc., likely not in benzo withdrawal however patient likely to be fall risk on benzos which may be worsening her overall impulse control as well. Dementia with likely superimposed delirium, would benefit from expeditious discharge as hospitalization itself is delirium risk for her.     Unable to reach family, some documentation about son's wishes to avoid antipsychotics due to BBB, will need to reach to discuss.  ISTOP - xanax 0.5mg 4x daily, had  been down to xanax 0.25mg TID on 12/26. Would avoid CIWA as patient likely to get over-medicated on this due to symptom overlap and lack of reliability.     12/28 - patient doing much better, less agitated and confused, tolerating xanax taper no withdrawal features    Recs:  - C/w benzo taper:  >>Xanax 0.25mg TID for 1d  >>Xanax 0.25mg BID for 1d  >>Xanax 0.25mg qHS for 1d then STOP  - PRN xanax 0.25mg TID PRN anxiety, agitation or signs of benzo withdrawal (2 or more of the following HR >110, SBP >140, tremors and diaphoresis)  - would avoid CIWA  - Increase Zoloft to 75mg QD  - C/w Mirtazapine 15mg qHS as ordered   - no psychiatric c/i to discharge (if PMD or family would prefer to do benzo taper as outpt this may suffice)

## 2022-06-13 NOTE — QUICK NOTE
June 13th, 0757AM      Radiology reading room called me to notify me that there were additional findings on CT that were not noted on the [facility] read that was previously documented  There is a documented 1 4cm right adnexal cyst  Patient was previously evaluated by myself for LUQ pain  She did not have tenderness in the RLQ or pelvic/urinary symptoms  I did call and speak with the patient regarding these additional findings  I explained the diagnosis  She states she is feeling better  Advised her to follow up with PCP, OBGYN, and to return to the ER if anything acutely changes

## 2022-06-15 ENCOUNTER — CONSULT (OUTPATIENT)
Dept: GASTROENTEROLOGY | Facility: CLINIC | Age: 22
End: 2022-06-15
Payer: COMMERCIAL

## 2022-06-15 VITALS
HEART RATE: 83 BPM | HEIGHT: 64 IN | BODY MASS INDEX: 25.37 KG/M2 | WEIGHT: 148.6 LBS | DIASTOLIC BLOOD PRESSURE: 87 MMHG | SYSTOLIC BLOOD PRESSURE: 122 MMHG

## 2022-06-15 DIAGNOSIS — R10.12 LEFT UPPER QUADRANT ABDOMINAL PAIN: Primary | ICD-10-CM

## 2022-06-15 DIAGNOSIS — R93.3 ABNORMAL CT SCAN, STOMACH: ICD-10-CM

## 2022-06-15 PROCEDURE — 99204 OFFICE O/P NEW MOD 45 MIN: CPT | Performed by: INTERNAL MEDICINE

## 2022-06-15 NOTE — PROGRESS NOTES
[facility] - Outpatient Consultation  Eliana Contreras 22 y o  female MRN: [id number]  Encounter: [id number]        ASSESSMENT AND PLAN:       Diagnoses and all orders for this visit:    Left upper quadrant abdominal pain    Abnormal CT scan, stomach    Described symptoms are most suggestive of musculoskeletal pain  CT scan images were reviewed and do show some gastric distension and retained food material, though this may be physiologic  She does not describe symptoms to suggest ongoing gastroparesis, gastritis or peptic ulcer disease  We discussed options and will opt for watchful waiting  If her symptoms persist or change, will consider further evaluation with EGD or other testing as indicated  For now will plan to follow-up as needed  ______________________________________________________________________    HPI:  Patient with history of mono in the recent past with splenomegaly and abdominal fullness and discomfort had acute onset of left upper quadrant sharp stabbing pain several days ago and presented to the emergency room  There she underwent labs and CT scan which were reviewed  Labs including CBC, CMP and lipase were within normal limits  CT scan was initially read as revealing a distended stomach containing food and debris but on final reading this had been amended to "unremarkable  "Her pain is not associated with oral intake or bowel movements, is described as constant but has thus been slowly improving over time  It is worse with changes in body position  She has had no nausea vomiting, fever chills, jaundice or rashes, changes in bowel habit, blood in her stool  She had been treated with naproxen for back pain a number of months ago but took only a few doses and has not been using this in recent months  REVIEW OF SYSTEMS:    Review of Systems   All other systems reviewed and are negative         Historical Information   Past Medical History:   Diagnosis Date    Mononucleosis      History reviewed  No pertinent surgical history  Social History   Social History     Substance and Sexual Activity   Alcohol Use Not Currently    Comment: rarely     Social History     Substance and Sexual Activity   Drug Use Not Currently    Types: Marijuana    Comment: socially     Social History     Tobacco Use   Smoking Status Former Smoker    Types: Cigarettes   Smokeless Tobacco Never Used   Tobacco Comment    quit 2-3 years     Family History   Problem Relation Age of Onset   Anika Francisco' disease Mother     Hypertension Father        Meds/Allergies       Current Outpatient Medications:     norethindrone-ethinyl estradiol (Junel FE 1/20) 1-20 MG-MCG per tablet    naproxen (NAPROSYN) 500 mg tablet    sucralfate (CARAFATE) 1 g tablet    No Known Allergies        Objective     Blood pressure 122/87, pulse 83, height 5' 4" (1 626 m), weight 67 4 kg (148 lb 9 6 oz), not currently breastfeeding  Body mass index is 25 51 kg/m²  PHYSICAL EXAM:      Physical Exam  Vitals and nursing note reviewed  Constitutional:       General: She is not in acute distress  Appearance: She is not ill-appearing  HENT:      Head: Normocephalic and atraumatic  Eyes:      General: No scleral icterus  Extraocular Movements: Extraocular movements intact  Cardiovascular:      Rate and Rhythm: Normal rate and regular rhythm  Pulmonary:      Effort: Pulmonary effort is normal  No respiratory distress  Abdominal:      General: There is no distension  Palpations: Abdomen is soft  Tenderness: There is no abdominal tenderness  There is no guarding or rebound  Comments: Abdominal exam benign, pain not reproducible   Musculoskeletal:      Right lower leg: No edema  Left lower leg: No edema  Skin:     General: Skin is warm and dry  Coloration: Skin is not cyanotic  Findings: No erythema  Neurological:      General: No focal deficit present        Mental Status: She is alert and oriented to person, place, and time  Psychiatric:         Mood and Affect: Mood normal          Behavior: Behavior normal               Lab Results:   No visits with results within 1 Day(s) from this visit     Latest known visit with results is:   Admission on 06/12/2022, Discharged on 06/13/2022   Component Date Value    WBC 06/12/2022 7 15     RBC 06/12/2022 4 42     Hemoglobin 06/12/2022 14 6     Hematocrit 06/12/2022 42 5     MCV 06/12/2022 96     MCH 06/12/2022 33 0     MCHC 06/12/2022 34 4     RDW 06/12/2022 11 9     MPV 06/12/2022 9 6     Platelets 82/64/0783 311     nRBC 06/12/2022 0     Neutrophils Relative 06/12/2022 55     Immat GRANS % 06/12/2022 1     Lymphocytes Relative 06/12/2022 35     Monocytes Relative 06/12/2022 9     Eosinophils Relative 06/12/2022 0     Basophils Relative 06/12/2022 0     Neutrophils Absolute 06/12/2022 3 99     Immature Grans Absolute 06/12/2022 0 04     Lymphocytes Absolute 06/12/2022 2 48     Monocytes Absolute 06/12/2022 0 62     Eosinophils Absolute 06/12/2022 0 00     Basophils Absolute 06/12/2022 0 02     Sodium 06/12/2022 138     Potassium 06/12/2022 3 5     Chloride 06/12/2022 104     CO2 06/12/2022 26     ANION GAP 06/12/2022 8     BUN 06/12/2022 10     Creatinine 06/12/2022 0 80     Glucose 06/12/2022 89     Calcium 06/12/2022 9 3     AST 06/12/2022 16     ALT 06/12/2022 17     Alkaline Phosphatase 06/12/2022 49     Total Protein 06/12/2022 7 3     Albumin 06/12/2022 4 5     Total Bilirubin 06/12/2022 0 38     eGFR 06/12/2022 106     Lipase 06/12/2022 13     EXT PREG TEST UR (Ref: N* 06/12/2022 negative     Control 06/12/2022 valid     Color, UA 06/12/2022 Colorless     Clarity, UA 06/12/2022 Clear     Specific Gravity, UA 06/12/2022 <=1 005     pH, UA 06/12/2022 6 0     Leukocytes, UA 06/12/2022 Trace (A)    Nitrite, UA 06/12/2022 Negative     Protein, UA 06/12/2022 Negative     Glucose, UA 06/12/2022 Negative     Ketones, UA 06/12/2022 Negative     Urobilinogen, UA 06/12/2022 0 2     Bilirubin, UA 06/12/2022 Negative     Blood, UA 06/12/2022 Negative     hs TnI 0hr 06/12/2022 <2     D-Dimer, Quant 06/12/2022 0 35     TSH 3RD GENERATON 06/12/2022 2 276     LACTIC ACID 06/12/2022 0 7     RBC, UA 06/12/2022 None Seen     WBC, UA 06/12/2022 0-1     Epithelial Cells 06/12/2022 Occasional     Bacteria, UA 06/12/2022 Occasional     Ventricular Rate 06/12/2022 86     Atrial Rate 06/12/2022 86     MN Interval 06/12/2022 138     QRSD Interval 06/12/2022 78     QT Interval 06/12/2022 344     QTC Interval 06/12/2022 411     P Axis 06/12/2022 62     QRS Axis 06/12/2022 0     T Wave Axis 06/12/2022 47     Ventricular Rate 06/12/2022 85     Atrial Rate 06/12/2022 85     MN Interval 06/12/2022 136     QRSD Interval 06/12/2022 76     QT Interval 06/12/2022 338     QTC Interval 06/12/2022 402     P Axis 06/12/2022 63     QRS Axis 06/12/2022 -1     T Wave Axis 06/12/2022 59          Radiology Results:   CT abdomen pelvis wo contrast    Result Date: 6/13/2022  Narrative: CT ABDOMEN AND PELVIS WITHOUT IV CONTRAST INDICATION:   LUQ pain, history mono  COMPARISON:  Comparison is made to the relevant components of CT of chest, abdomen, and pelvis dated 5/5/2022 TECHNIQUE:  CT examination of the abdomen and pelvis was performed without intravenous contrast  This examination was performed without intravenous contrast in the context of the critical nationwide Omnipaque shortage  Axial, sagittal, and coronal 2D reformatted images were created from the source data and submitted for interpretation  Radiation dose length product (DLP) for this visit:  347 mGy-cm   This examination, like all CT scans performed in the [facility], was performed utilizing techniques to minimize radiation dose exposure, including the use of iterative reconstruction and automated exposure control   Enteric contrast was administered  FINDINGS: Evaluation of the solid organs, soft tissues, and vasculature is limited without intravenous contrast  ABDOMEN LOWER CHEST:  No clinically significant abnormality identified in the visualized lower chest  LIVER/BILIARY TREE:  Unremarkable  GALLBLADDER:  No calcified gallstones  No pericholecystic inflammatory change  SPLEEN:  Unremarkable  PANCREAS:  Unremarkable  ADRENAL GLANDS:  Unremarkable  KIDNEYS/URETERS:  Unremarkable  No hydronephrosis  STOMACH AND BOWEL:  Unremarkable  APPENDIX:  No findings to suggest appendicitis  ABDOMINOPELVIC CAVITY:  No ascites  No pneumoperitoneum  No lymphadenopathy  VESSELS:  Unremarkable for patient's age  PELVIS REPRODUCTIVE ORGANS:  Unremarkable for patient's age  Note is made of a 1 4 cm right adnexal cyst on image 67 of series 2 which was not mentioned on the preliminary [facility] interpretation  URINARY BLADDER:  Unremarkable  ABDOMINAL WALL/INGUINAL REGIONS:  Unremarkable  OSSEOUS STRUCTURES:  No acute fracture or destructive osseous lesion  Impression: 1  No acute inflammatory process is identified on unenhanced CT scan of the abdomen and pelvis to explain the patient's symptomatology  2   Note is made of a 1 4 cm right adnexal cyst  The major findings are in agreement with the preliminary report provided by [employer] which was provided shortly after completion of the exam  The additional finding of  1 4 cm right adnexal cyst   will now be communicated with patient's clinical team by our radiology liaison  The study was marked in [facility] for immediate notification  Workstation performed: [id number]     XR chest 1 view portable    Result Date: 6/13/2022  Narrative: CHEST INDICATION:   LUQ pain  COMPARISON:  5/5/2022 CT EXAM PERFORMED/VIEWS:  XR CHEST PORTABLE Single view FINDINGS: Cardiomediastinal silhouette appears unremarkable  The lungs are clear  No pneumothorax or pleural effusion   Osseous structures appear within normal limits for patient age  Impression: No acute cardiopulmonary disease   Findings are stable Workstation performed: [id number]

## 2022-06-24 ENCOUNTER — OFFICE VISIT (OUTPATIENT)
Dept: OBGYN CLINIC | Facility: CLINIC | Age: 22
End: 2022-06-24
Payer: COMMERCIAL

## 2022-06-24 VITALS
BODY MASS INDEX: 24.92 KG/M2 | HEIGHT: 64 IN | WEIGHT: 146 LBS | SYSTOLIC BLOOD PRESSURE: 112 MMHG | DIASTOLIC BLOOD PRESSURE: 68 MMHG

## 2022-06-24 DIAGNOSIS — R10.2 PELVIC PAIN: Primary | ICD-10-CM

## 2022-06-24 DIAGNOSIS — Z11.3 SCREENING FOR STDS (SEXUALLY TRANSMITTED DISEASES): ICD-10-CM

## 2022-06-24 PROCEDURE — 87491 CHLMYD TRACH DNA AMP PROBE: CPT | Performed by: OBSTETRICS & GYNECOLOGY

## 2022-06-24 PROCEDURE — 87591 N.GONORRHOEAE DNA AMP PROB: CPT | Performed by: OBSTETRICS & GYNECOLOGY

## 2022-06-24 PROCEDURE — 87510 GARDNER VAG DNA DIR PROBE: CPT | Performed by: OBSTETRICS & GYNECOLOGY

## 2022-06-24 PROCEDURE — 87480 CANDIDA DNA DIR PROBE: CPT | Performed by: OBSTETRICS & GYNECOLOGY

## 2022-06-24 PROCEDURE — 99213 OFFICE O/P EST LOW 20 MIN: CPT | Performed by: OBSTETRICS & GYNECOLOGY

## 2022-06-24 PROCEDURE — 87660 TRICHOMONAS VAGIN DIR PROBE: CPT | Performed by: OBSTETRICS & GYNECOLOGY

## 2022-06-24 RX ORDER — SULFAMETHOXAZOLE AND TRIMETHOPRIM 800; 160 MG/1; MG/1
1 TABLET ORAL 2 TIMES DAILY
COMMUNITY
Start: 2022-06-21 | End: 2022-07-15 | Stop reason: ALTCHOICE

## 2022-06-24 NOTE — PROGRESS NOTES
Assessment/Plan:     There are no diagnoses linked to this encounter  52-year-old female  Dysmenorrhea respond to OCP   Suprapubic tenderness/UTI symptom   Vaginal discharge   Plan   Culture obtained to check for any infection   Continue antibiotics   Increase hydration recommended   Continue OCP   If pain persists or reoccur I would recommend pelvic ultrasound  Subjective:      Patient ID: Kamini Huitron is a 22 y o  female  52-year-old female presents to the office today follow-up from the emergency room secondary to lower pelvic pain that is improved after she start taking antibiotics for UTI    Pelvic Pain  The patient's primary symptoms include pelvic pain  This is a new problem  The current episode started in the past 7 days  The problem occurs intermittently  The problem has been waxing and waning  The pain is moderate  The problem affects both sides  She is not pregnant  Associated symptoms include abdominal pain, dysuria and frequency  Pertinent negatives include no constipation, diarrhea, nausea, painful intercourse or urgency  The symptoms are aggravated by activity  She has tried nothing for the symptoms  She is sexually active  She uses oral contraceptives for contraception  Her menstrual history has been regular  no pain resolve only lower pelvic pain   lmp last week   The following portions of the patient's history were reviewed and updated as appropriate: allergies, current medications, past family history, past medical history, past social history, past surgical history and problem list     Review of Systems   Gastrointestinal: Positive for abdominal pain  Negative for constipation, diarrhea and nausea  Genitourinary: Positive for dysuria, frequency and pelvic pain  Negative for urgency           Objective:      /68 (BP Location: Left arm, Patient Position: Sitting, Cuff Size: Adult)   Ht 5' 4" (1 626 m)   Wt 66 2 kg (146 lb)   LMP 06/14/2022   BMI 25 06 kg/m²   CT scan  PELVIS     REPRODUCTIVE ORGANS:  Unremarkable for patient's age  Note is made of a 1 4 cm right adnexal cyst on image 67 of series 2 which was not mentioned on the preliminary [facility] interpretation      URINARY BLADDER:  Unremarkable      ABDOMINAL WALL/INGUINAL REGIONS:  Unremarkable      OSSEOUS STRUCTURES:  No acute fracture or destructive osseous lesion      IMPRESSION:     1  No acute inflammatory process is identified on unenhanced CT scan of the abdomen and pelvis to explain the patient's symptomatology  2   Note is made of a 1 4 cm right adnexal cyst      Results review explained to the patient as ovarian cyst is very small less likely etiology of her symptoms as well as patient is currently taking oral contraceptive pills which would help with development of new ovarian cyst if pain reoccur I would recommend pelvic ultrasound otherwise to continue her antibiotics for UTI culture will be obtained to check for any infection   Physical Exam  Constitutional:       Appearance: She is well-developed  Abdominal:      General: There is no distension  Palpations: Abdomen is soft  Tenderness: There is no abdominal tenderness  Genitourinary:     Labia:         Right: No rash, tenderness or lesion  Left: No rash, tenderness or lesion  Vagina: No signs of injury  Vaginal discharge present  No erythema or tenderness  Cervix: Discharge present  No cervical motion tenderness or friability  Uterus: Normal        Adnexa:         Right: No mass, tenderness or fullness  Left: No mass, tenderness or fullness  Comments: Very mild suprapubic tenderness noted on exam, white frothy discharge noted culture obtained to check for any infection  Neurological:      Mental Status: She is alert and oriented to person, place, and time     Psychiatric:         Behavior: Behavior normal

## 2022-06-25 LAB
C TRACH DNA SPEC QL NAA+PROBE: NEGATIVE
CANDIDA RRNA VAG QL PROBE: NEGATIVE
G VAGINALIS RRNA GENITAL QL PROBE: POSITIVE
N GONORRHOEA DNA SPEC QL NAA+PROBE: NEGATIVE
T VAGINALIS RRNA GENITAL QL PROBE: NEGATIVE

## 2022-06-26 DIAGNOSIS — B96.89 BV (BACTERIAL VAGINOSIS): Primary | ICD-10-CM

## 2022-06-26 DIAGNOSIS — N76.0 BV (BACTERIAL VAGINOSIS): Primary | ICD-10-CM

## 2022-06-26 RX ORDER — METRONIDAZOLE 7.5 MG/G
1 GEL VAGINAL DAILY
Qty: 70 G | Refills: 0 | Status: SHIPPED | OUTPATIENT
Start: 2022-06-26 | End: 2022-07-01

## 2022-07-05 ENCOUNTER — EVALUATION (OUTPATIENT)
Dept: PHYSICAL THERAPY | Facility: MEDICAL CENTER | Age: 22
End: 2022-07-05
Payer: COMMERCIAL

## 2022-07-05 DIAGNOSIS — G89.29 CHRONIC THORACIC BACK PAIN, UNSPECIFIED BACK PAIN LATERALITY: Primary | ICD-10-CM

## 2022-07-05 DIAGNOSIS — M54.2 NECK PAIN: ICD-10-CM

## 2022-07-05 DIAGNOSIS — M54.6 CHRONIC THORACIC BACK PAIN, UNSPECIFIED BACK PAIN LATERALITY: Primary | ICD-10-CM

## 2022-07-05 PROCEDURE — 97161 PT EVAL LOW COMPLEX 20 MIN: CPT

## 2022-07-05 PROCEDURE — 97110 THERAPEUTIC EXERCISES: CPT

## 2022-07-05 NOTE — LETTER
2022    Kaia Age, [facility]  [address]    Patient: Donna Saenz   YOB: 2000   Date of Visit: 2022     Encounter Diagnosis     ICD-10-CM    1  Chronic thoracic back pain, unspecified back pain laterality  M54 6     G89 29    2  Neck pain  M54 2        Dear Dr Marlee Saba:    Thank you for your recent referral of Donna Saenz  Please review the attached evaluation summary from Ligia's recent visit  Please verify that you agree with the plan of care by signing the attached order  If you have any questions or concerns, please do not hesitate to call  I sincerely appreciate the opportunity to share in the care of one of your patients and hope to have another opportunity to work with you in the near future  Sincerely,    Gina Dial, PT      Referring Provider:      I certify that I have read the below Plan of Care and certify the need for these services furnished under this plan of treatment while under my care  Kaia Rivas, [facility]  [address] [address]  Via Fax: [phone]          PT Evaluation     Today's date: 2022  Patient name: Donna Saenz  : 2000  MRN: [id number]  Referring provider: Bard Talia MD  Dx:   Encounter Diagnosis     ICD-10-CM    1  Chronic thoracic back pain, unspecified back pain laterality  M54 6     G89 29    2  Neck pain  M54 2        Start Time: 0628  Stop Time: 1655  Total time in clinic (min): 40 minutes    Assessment  Assessment details: Donna Saenz is a 22y/o female who presents to OP PT with a a referral from Dr Marlee Saba with a medical diagnosis of neck, back and wrist pain  Upon examination pt presents with decreased cervical, lumbar and wrist ROM, decreased general strength, decreased functional mobility, decreased muscular endurance, and increased pain   Previously mentioned deficits have impaired patients ability to perform ADLs, recreational activities and house hold duties  Pt was educated on examination findings, diagnosis, prognosis, home exercise program to complete  Pt states understanding and consents to skilled PT services  Pt is a good candidate for skilled PT services  Positive prognositic indicators include desire to improve and active lifestyle  Negative prognostic indicators include chronicity of symptoms  Pt would benefit from skilled PT services to address functional deficits to return to PLOF  Impairments: abnormal muscle firing, abnormal muscle tone, abnormal or restricted ROM, activity intolerance, impaired physical strength, lacks appropriate home exercise program, pain with function, weight-bearing intolerance and poor body mechanics    Symptom irritability: moderateUnderstanding of Dx/Px/POC: good   Prognosis: good    Goals  STG 2-6 weeks  1  Improve MMT by 1/2 muscle grade in all deficient planes  2   Report 2-3 point decrease on NPRS  3   Improve cervical ROM by 25%  LTG 6 weeks to discharge  1  Improve MMT to at least 4/5 in all deficient planes  2   Improve lumbar restrictions to minimal  3  Return to PLOF in all recreational activities, work duties and ADLs       Plan  Patient would benefit from: PT eval and skilled physical therapy  Planned modality interventions: biofeedback, high voltage pulsed current: pain management, high voltage pulsed current: spasm management, thermotherapy: hydrocollator packs, thermotherapy: paraffin bath and electrical stimulation/Russian stimulation  Planned therapy interventions: abdominal trunk stabilization, activity modification, joint mobilization, IADL retraining, manual therapy, massage, Mooney taping, ADL retraining, ADL training, balance/weight bearing training, orthotic management and training, postural training, body mechanics training, breathing training, balance, self care, strengthening, stretching, therapeutic activities, therapeutic exercise, therapeutic training, transfer training, functional ROM exercises, fine motor coordination training, flexibility, gait training, graded exercise, graded activity, graded motor and home exercise program  Frequency: 2x week  Plan of Care beginning date: 7/5/2022  Plan of Care expiration date: 9/27/2022        Subjective Evaluation    History of Present Illness  Mechanism of injury: Subjective: Patient previously diagnosed with mononucleosis this year  She states she spent a lot of time in bed and developed an annoying low back and neck pain  She states since returning to work as a  at [employer] she has noticed an increase in right hand numbness and tingling  She states she continues to sleep on her back  She denies paresthesias  Pain  Type: constant throb  Current: 0/10  Best: 0/10  Worst: 6/10  Alleviates: none  Aggravates: posture, work, sleep, rest    Occupation:  at [employer]    Imaging:  n/a    PMH: BMI,     Previous Surgeries: n/a    Previous Physical Activity: occasionally works out at [employer]    Current Medications: see file    GALINA: insidious    Surgery Date: n/a     MD: Dr Anat Montes    Patient Goals: to be out of pain               Objective     Concurrent Complaints  Negative for night pain, disturbed sleep, bladder dysfunction, bowel dysfunction and saddle (S4) numbness    Palpation   Left   Hypertonic in the upper trapezius  Hypotonic in the lower trapezius, lumbar paraspinals and middle trapezius  Tenderness of the cervical paraspinals and suboccipitals  Trigger point to cervical paraspinals  Right   Hypertonic in the upper trapezius  Hypotonic in the lower trapezius, lumbar paraspinals and middle trapezius  Tenderness of the cervical paraspinals, quadratus lumborum and suboccipitals  Trigger point to cervical paraspinals and upper trapezius       Active Range of Motion   Cervical/Thoracic Spine       Cervical    Flexion:  Restriction level: moderate  Extension:  Restriction level: moderate  Left lateral flexion:  Restriction level: moderate  Right lateral flexion:  Restriction level moderate  Left rotation:  Restriction level: moderate  Right rotation:  Restriction level: moderate    Lumbar   Flexion:  with pain Restriction level: maximal  Extension:  Restriction level: moderate  Left lateral flexion:  Restriction level: moderate  Right lateral flexion:  Restriction level: moderate  Left rotation:  Restriction level: moderate  Right rotation:  Restriction level: moderate    Joint Play     Pain: C6, C7, T1, T2, T3, T4, T5, T6, T7, T8, T9, T10, T11, T12, L1, L2, L3, L4, L5 and S1     Strength/Myotome Testing     Left Shoulder     Planes of Motion   Flexion: 3+   Extension: 3+   Abduction: 3+   External rotation at 0°: 3+   Internal rotation at 0°: 3+     Isolated Muscles   Lower trapezius: 3-   Middle trapezius: 3-     Right Shoulder     Planes of Motion   Flexion: 3+   Extension: 3+   Abduction: 3+   External rotation at 0°: 3+   Internal rotation at 0°: 3+     Isolated Muscles   Lower trapezius: 3-   Middle trapezius: 3-     Left Elbow   Flexion: 3+  Extension: 3+    Right Elbow   Flexion: 3+  Extension: 3+    Left Hip   Planes of Motion   Flexion: 3  Extension: 3  Abduction: 3  External rotation: 3  Internal rotation: 3    Right Hip   Planes of Motion   Flexion: 3  Extension: 3  Abduction: 3  External rotation: 3  Internal rotation: 3    Left Knee   Flexion: 3  Extension: 3    Right Knee   Flexion: 3  Extension: 3    Left Ankle/Foot   Dorsiflexion: 3  Plantar flexion: 3    Right Ankle/Foot   Dorsiflexion: 3  Plantar flexion: 3    Tests     Lumbar     Left   Negative crossed SLR and passive SLR  Right   Negative crossed SLR and passive SLR                Precautions: universal      Manuals 7/5/2022              IASTM             (-) pressure STM                                       Neuro Re-Ed             Scap Retraction HEP            Bridge Hip Ext             TB Row                                                    Ther Ex             UT Stretch HEP            LS Stretch HEP            UBE             Bike                                                                 Ther Activity                                       Gait Training                                       Modalities

## 2022-07-05 NOTE — PROGRESS NOTES
PT Evaluation     Today's date: 2022  Patient name: James Gamboa  : 2000  MRN: [id number]  Referring provider: Latrice Ferrari MD  Dx:   Encounter Diagnosis     ICD-10-CM    1  Chronic thoracic back pain, unspecified back pain laterality  M54 6     G89 29    2  Neck pain  M54 2        Start Time: 8689  Stop Time: 1655  Total time in clinic (min): 40 minutes    Assessment  Assessment details: James Gamboa is a 20y/o female who presents to OP PT with a a referral from Dr Nestor Sherwood with a medical diagnosis of neck, back and wrist pain  Upon examination pt presents with decreased cervical, lumbar and wrist ROM, decreased general strength, decreased functional mobility, decreased muscular endurance, and increased pain  Previously mentioned deficits have impaired patients ability to perform ADLs, recreational activities and house hold duties  Pt was educated on examination findings, diagnosis, prognosis, home exercise program to complete  Pt states understanding and consents to skilled PT services  Pt is a good candidate for skilled PT services  Positive prognositic indicators include desire to improve and active lifestyle  Negative prognostic indicators include chronicity of symptoms  Pt would benefit from skilled PT services to address functional deficits to return to PLOF  Impairments: abnormal muscle firing, abnormal muscle tone, abnormal or restricted ROM, activity intolerance, impaired physical strength, lacks appropriate home exercise program, pain with function, weight-bearing intolerance and poor body mechanics    Symptom irritability: moderateUnderstanding of Dx/Px/POC: good   Prognosis: good    Goals  STG 2-6 weeks  1  Improve MMT by 1/2 muscle grade in all deficient planes  2   Report 2-3 point decrease on NPRS  3   Improve cervical ROM by 25%  LTG 6 weeks to discharge  1  Improve MMT to at least 4/5 in all deficient planes  2   Improve lumbar restrictions to minimal  3  Return to PLOF in all recreational activities, work duties and ADLs  Plan  Patient would benefit from: PT eval and skilled physical therapy  Planned modality interventions: biofeedback, high voltage pulsed current: pain management, high voltage pulsed current: spasm management, thermotherapy: hydrocollator packs, thermotherapy: paraffin bath and electrical stimulation/Russian stimulation  Planned therapy interventions: abdominal trunk stabilization, activity modification, joint mobilization, IADL retraining, manual therapy, massage, Mooney taping, ADL retraining, ADL training, balance/weight bearing training, orthotic management and training, postural training, body mechanics training, breathing training, balance, self care, strengthening, stretching, therapeutic activities, therapeutic exercise, therapeutic training, transfer training, functional ROM exercises, fine motor coordination training, flexibility, gait training, graded exercise, graded activity, graded motor and home exercise program  Frequency: 2x week  Plan of Care beginning date: 7/5/2022  Plan of Care expiration date: 9/27/2022        Subjective Evaluation    History of Present Illness  Mechanism of injury: Subjective: Patient previously diagnosed with mononucleosis this year  She states she spent a lot of time in bed and developed an annoying low back and neck pain  She states since returning to work as a  at [employer] she has noticed an increase in right hand numbness and tingling  She states she continues to sleep on her back  She denies paresthesias       Pain  Type: constant throb  Current: 0/10  Best: 0/10  Worst: 6/10  Alleviates: none  Aggravates: posture, work, sleep, rest    Occupation:  at [employer]    Imaging:  n/a    PMH: BMI,     Previous Surgeries: n/a    Previous Physical Activity: occasionally works out at [email or web address]    Current Medications: see file    GALINA: insidious    Surgery Date: n/a     MD:   Dominique    Patient Goals: to be out of pain               Objective     Concurrent Complaints  Negative for night pain, disturbed sleep, bladder dysfunction, bowel dysfunction and saddle (S4) numbness    Palpation   Left   Hypertonic in the upper trapezius  Hypotonic in the lower trapezius, lumbar paraspinals and middle trapezius  Tenderness of the cervical paraspinals and suboccipitals  Trigger point to cervical paraspinals  Right   Hypertonic in the upper trapezius  Hypotonic in the lower trapezius, lumbar paraspinals and middle trapezius  Tenderness of the cervical paraspinals, quadratus lumborum and suboccipitals  Trigger point to cervical paraspinals and upper trapezius       Active Range of Motion   Cervical/Thoracic Spine       Cervical    Flexion:  Restriction level: moderate  Extension:  Restriction level: moderate  Left lateral flexion:  Restriction level: moderate  Right lateral flexion:  Restriction level moderate  Left rotation:  Restriction level: moderate  Right rotation:  Restriction level: moderate    Lumbar   Flexion:  with pain Restriction level: maximal  Extension:  Restriction level: moderate  Left lateral flexion:  Restriction level: moderate  Right lateral flexion:  Restriction level: moderate  Left rotation:  Restriction level: moderate  Right rotation:  Restriction level: moderate    Joint Play     Pain: C6, C7, T1, T2, T3, T4, T5, T6, T7, T8, T9, T10, T11, T12, L1, L2, L3, L4, L5 and S1     Strength/Myotome Testing     Left Shoulder     Planes of Motion   Flexion: 3+   Extension: 3+   Abduction: 3+   External rotation at 0°: 3+   Internal rotation at 0°: 3+     Isolated Muscles   Lower trapezius: 3-   Middle trapezius: 3-     Right Shoulder     Planes of Motion   Flexion: 3+   Extension: 3+   Abduction: 3+   External rotation at 0°: 3+   Internal rotation at 0°: 3+     Isolated Muscles   Lower trapezius: 3-   Middle trapezius: 3-     Left Elbow   Flexion: 3+  Extension: 3+    Right Elbow   Flexion: 3+  Extension: 3+    Left Hip   Planes of Motion   Flexion: 3  Extension: 3  Abduction: 3  External rotation: 3  Internal rotation: 3    Right Hip   Planes of Motion   Flexion: 3  Extension: 3  Abduction: 3  External rotation: 3  Internal rotation: 3    Left Knee   Flexion: 3  Extension: 3    Right Knee   Flexion: 3  Extension: 3    Left Ankle/Foot   Dorsiflexion: 3  Plantar flexion: 3    Right Ankle/Foot   Dorsiflexion: 3  Plantar flexion: 3    Tests     Lumbar     Left   Negative crossed SLR and passive SLR  Right   Negative crossed SLR and passive SLR                Precautions: universal      Manuals 7/5/2022              IASTM             (-) pressure STM                                       Neuro Re-Ed             Scap Retraction HEP            Bridge             Hip Ext             TB Row                                                    Ther Ex             UT Stretch HEP            LS Stretch HEP            UBE             Bike                                                                 Ther Activity                                       Gait Training                                       Modalities

## 2022-07-11 ENCOUNTER — OFFICE VISIT (OUTPATIENT)
Dept: PHYSICAL THERAPY | Facility: MEDICAL CENTER | Age: 22
End: 2022-07-11
Payer: COMMERCIAL

## 2022-07-11 DIAGNOSIS — G89.29 CHRONIC THORACIC BACK PAIN, UNSPECIFIED BACK PAIN LATERALITY: Primary | ICD-10-CM

## 2022-07-11 DIAGNOSIS — M54.2 NECK PAIN: ICD-10-CM

## 2022-07-11 DIAGNOSIS — M54.6 CHRONIC THORACIC BACK PAIN, UNSPECIFIED BACK PAIN LATERALITY: Primary | ICD-10-CM

## 2022-07-11 PROCEDURE — 97112 NEUROMUSCULAR REEDUCATION: CPT

## 2022-07-11 PROCEDURE — 97110 THERAPEUTIC EXERCISES: CPT

## 2022-07-11 NOTE — PROGRESS NOTES
Daily Note     Today's date: 2022  Patient name: Diamante Souza  : 2000  MRN: [id number]  Referring provider: Akira Cordoba MD  Dx:   Encounter Diagnosis     ICD-10-CM    1  Chronic thoracic back pain, unspecified back pain laterality  M54 6     G89 29    2  Neck pain  M54 2        Start Time: 0621  Stop Time: 9533  Total time in clinic (min): 45 minutes    Subjective: Pt states initially she felt a stretch with HEP, now no longer does  Objective: See treatment diary below      Assessment: Tolerated treatment well  Patient denies numbness and tingling throughout session  Occasional cues required for improved body mechanics  Patient demonstrated fatigue post treatment, exhibited good technique with therapeutic exercises and would benefit from continued PT      Plan: Continue per plan of care  Progress treatment as tolerated         Precautions: universal    PT 1:1 7262-3599  Manuals 2022             IASTM             (-) pressure STM                                       Neuro Re-Ed             Scap Retraction HEP 5" 15x           Bridge  3" 3x10           Hip Ext  3" 3x10           TB Row  3x10 CC 30#                                                  Ther Ex             UT Stretch HEP 30" 3x           LS Stretch HEP 30" 3x           UBE             Bike             Open Book  10x BL           T-Spine Series  Flex/Abd, Hugs 10x ea           Prone Press Up  2x10                        Ther Activity                                       Gait Training                                       Modalities

## 2022-07-13 ENCOUNTER — OFFICE VISIT (OUTPATIENT)
Dept: PHYSICAL THERAPY | Facility: MEDICAL CENTER | Age: 22
End: 2022-07-13
Payer: COMMERCIAL

## 2022-07-13 ENCOUNTER — TELEPHONE (OUTPATIENT)
Dept: OBGYN CLINIC | Facility: CLINIC | Age: 22
End: 2022-07-13

## 2022-07-13 DIAGNOSIS — M54.2 NECK PAIN: ICD-10-CM

## 2022-07-13 DIAGNOSIS — M54.6 CHRONIC THORACIC BACK PAIN, UNSPECIFIED BACK PAIN LATERALITY: Primary | ICD-10-CM

## 2022-07-13 DIAGNOSIS — G89.29 CHRONIC THORACIC BACK PAIN, UNSPECIFIED BACK PAIN LATERALITY: Primary | ICD-10-CM

## 2022-07-13 PROCEDURE — 97110 THERAPEUTIC EXERCISES: CPT

## 2022-07-13 PROCEDURE — 97112 NEUROMUSCULAR REEDUCATION: CPT

## 2022-07-13 NOTE — PROGRESS NOTES
Daily Note     Today's date: 2022  Patient name: Alejandro Ivy  : 2000  MRN: [id number]  Referring provider: Del Falk MD  Dx:   Encounter Diagnosis     ICD-10-CM    1  Chronic thoracic back pain, unspecified back pain laterality  M54 6     G89 29    2  Neck pain  M54 2        Start Time: 1645          Subjective: Pt states an increase in soreness after previous session  She is iterested in starting light exercise again  Objective: See treatment diary below      Assessment: Tolerated treatment well  Pt notes fatigue this session, however does not limit performance of exercise  Patient demonstrated fatigue post treatment, exhibited good technique with therapeutic exercises and would benefit from continued PT      Plan: Continue per plan of care  Progress treatment as tolerated         Precautions: universal    PT 1:1 4591-5403  Manuals 2022            IASTM             (-) pressure STM                                       Neuro Re-Ed             Scap Retraction HEP 5" 15x 5" 15x          Bridge  3" 3x10 3" 3x10          Hip Ext  3" 3x10 3" 3x10          TB Row  3x10 CC 30# 3x10 CC 30#          SL Hip Abd   3x10          SLR   3x10                       Ther Ex             UT Stretch HEP 30" 3x 30" 3x           LS Stretch HEP 30" 3x 30" 3x          UBE             Bike             Open Book  10x BL 10x BL          T-Spine Series  Flex/Abd, Hugs 10x ea Flex/Abd/Hugs 10x ea          Prone Press Up  2x10 2x10                       Ther Activity                                       Gait Training                                       Modalities

## 2022-07-15 ENCOUNTER — OFFICE VISIT (OUTPATIENT)
Dept: OBGYN CLINIC | Facility: CLINIC | Age: 22
End: 2022-07-15
Payer: COMMERCIAL

## 2022-07-15 VITALS
HEIGHT: 64 IN | SYSTOLIC BLOOD PRESSURE: 128 MMHG | WEIGHT: 153.2 LBS | DIASTOLIC BLOOD PRESSURE: 80 MMHG | BODY MASS INDEX: 26.15 KG/M2

## 2022-07-15 DIAGNOSIS — R10.2 PELVIC PAIN: Primary | ICD-10-CM

## 2022-07-15 PROCEDURE — 99213 OFFICE O/P EST LOW 20 MIN: CPT | Performed by: PHYSICIAN ASSISTANT

## 2022-07-15 NOTE — PROGRESS NOTES
Assessment/Plan:     Diagnoses and all orders for this visit:    Pelvic pain  -     US pelvis complete w transvaginal; Future      22y/o F on OCP for dysmenorrhea, presenting for dysmenorrhea with this period, had been fairly well controlled prior  Seen in ED for pelvic pain, CT abd showed small 1 4cm right ovarian cyst  Saw dr Yanique Olivera in  F/u 6/24, had STD and vaginal cultures, positive for BV and treated  No vaginal or urinary sxs at this time  Unclear cause for painful 2-3 days during this expected period, no irregular bleeding or heavier bleeding than normal which is reassuring  Advised further eval w/ pelvic US - will call w/ results  Continue OCP and monitor periods and recurrent dysmenorrhea  Contact office if any further concerns  Otherwise make sure annual scheduled at end of year  Chief Complaint   Patient presents with    Dysmenorrhea           Subjective:      Patient ID: Marbella Larkin is a 22 y o  female     22y/o F presenting today for concern of pelvic pain, f/u from visit with dr Yanique Olivera on 6/24  Had STD screening and vaginal cultures at that time, positive for BV and treated  States with the OCP she has noticed improvement in her pelvic pain w/ periods, taking now for about a year  However was concerned as this last period came w/ a lot of pelvic pain  LMP: 7/12/22, with worst pain the 2nd day  States causing nausea  General lower pelvis  She did take tylenol, didn't help  The bleeding has been average and still has some slight spotting today  No cramping today  denies clotting or heavier bleeding than usual   Was slightly lighter than usual     Denies pelvic surgeries  taking OCP every day compliantly  States mom had general issues with her period        The following portions of the patient's history were reviewed and updated as appropriate: allergies, current medications, past family history, past medical history, past social history, past surgical history and problem list     Review of Systems   Constitutional: Negative  Gastrointestinal: Negative  Genitourinary: Negative for difficulty urinating, dysuria, flank pain, hematuria, vaginal discharge and vaginal pain  As in HPI         Objective:      /80 (BP Location: Left arm, Patient Position: Sitting, Cuff Size: Adult)   Ht 5' 4" (1 626 m)   Wt 69 5 kg (153 lb 3 2 oz)   LMP 07/12/2022 (Exact Date)   BMI 26 30 kg/m²          Physical Exam  Vitals reviewed  Constitutional:       Appearance: Normal appearance  Cardiovascular:      Rate and Rhythm: Normal rate and regular rhythm  Pulmonary:      Effort: Pulmonary effort is normal       Breath sounds: Normal breath sounds  Abdominal:      General: There is no distension  Tenderness: There is no abdominal tenderness  There is no guarding  Comments: No pelvic TTP   Neurological:      Mental Status: She is alert

## 2022-07-18 ENCOUNTER — OFFICE VISIT (OUTPATIENT)
Dept: PHYSICAL THERAPY | Facility: MEDICAL CENTER | Age: 22
End: 2022-07-18
Payer: COMMERCIAL

## 2022-07-18 DIAGNOSIS — G89.29 CHRONIC THORACIC BACK PAIN, UNSPECIFIED BACK PAIN LATERALITY: Primary | ICD-10-CM

## 2022-07-18 DIAGNOSIS — M54.2 NECK PAIN: ICD-10-CM

## 2022-07-18 DIAGNOSIS — M54.6 CHRONIC THORACIC BACK PAIN, UNSPECIFIED BACK PAIN LATERALITY: Primary | ICD-10-CM

## 2022-07-18 PROCEDURE — 97112 NEUROMUSCULAR REEDUCATION: CPT

## 2022-07-18 PROCEDURE — 97110 THERAPEUTIC EXERCISES: CPT

## 2022-07-18 NOTE — PROGRESS NOTES
Daily Note     Today's date: 2022  Patient name: Cynthia Mehta  : 2000  MRN: [id number]  Referring provider: Renetta Morrison MD  Dx:   Encounter Diagnosis     ICD-10-CM    1  Chronic thoracic back pain, unspecified back pain laterality  M54 6     G89 29    2  Neck pain  M54 2        Start Time: 1615  Stop Time: 1700  Total time in clinic (min): 45 minutes    Subjective: Pt states slight improvement in mid back symptoms  She does state she tried working with her  since previous visit  Objective: See treatment diary below      Assessment: Tolerated treatment well  Patient demonstrated fatigue post treatment, exhibited good technique with therapeutic exercises and would benefit from continued PT      Plan: Continue per plan of care  Progress treatment as tolerated         Precautions: universal    PT 1:1 9865-687  Manuals 2022           IASTM    B UT         (-) pressure STM                                       Neuro Re-Ed             Scap Retraction HEP 5" 15x 5" 15x 5" 15x         Bridge  3" 3x10 3" 3x10 3" 3x10         Hip Ext  3" 3x10 3" 3x10 3" 3x10         TB Row  3x10 CC 30# 3x10 CC 30# 3x10 CC 30#         SL Hip Abd   3x10 3x10         SLR   3x10 3x10         PNF D2 Flex BL    YTB 2x10          Horz Abd    YTB 2x10                                    Ther Ex             UT Stretch HEP 30" 3x 30" 3x  30" 3x         LS Stretch HEP 30" 3x 30" 3x 30" 3x         UBE             Bike             Open Book  10x BL 10x BL 10x BL         T-Spine Series  Flex/Abd, Hugs 10x ea Flex/Abd/Hugs 10x ea Flex/Abd/Hugs 10x ea         Prone Press Up  2x10 2x10                       Ther Activity                                       Gait Training                                       Modalities

## 2022-07-20 ENCOUNTER — OFFICE VISIT (OUTPATIENT)
Dept: PHYSICAL THERAPY | Facility: MEDICAL CENTER | Age: 22
End: 2022-07-20
Payer: COMMERCIAL

## 2022-07-20 DIAGNOSIS — G89.29 CHRONIC THORACIC BACK PAIN, UNSPECIFIED BACK PAIN LATERALITY: Primary | ICD-10-CM

## 2022-07-20 DIAGNOSIS — M54.6 CHRONIC THORACIC BACK PAIN, UNSPECIFIED BACK PAIN LATERALITY: Primary | ICD-10-CM

## 2022-07-20 DIAGNOSIS — M54.2 NECK PAIN: ICD-10-CM

## 2022-07-20 PROCEDURE — 97140 MANUAL THERAPY 1/> REGIONS: CPT

## 2022-07-20 PROCEDURE — 97110 THERAPEUTIC EXERCISES: CPT

## 2022-07-20 NOTE — PROGRESS NOTES
Daily Note     Today's date: 2022  Patient name: Ingrid Thompson  : 2000  MRN: [id number]  Referring provider: Lyly Valentino MD  Dx:   Encounter Diagnosis     ICD-10-CM    1  Chronic thoracic back pain, unspecified back pain laterality  M54 6     G89 29    2  Neck pain  M54 2        Start Time: 1256  Stop Time: 1331  Total time in clinic (min): 35 minutes    Subjective: Pt states an improvement in neck pain  She states significant improvement post IASTM  Objective: See treatment diary below      Assessment: Tolerated treatment well  Moderate restrictions with IASTM this session with R>L  Redness present, no petechiae  Patient demonstrated fatigue post treatment, exhibited good technique with therapeutic exercises and would benefit from continued PT      Plan: Continue per plan of care  Progress treatment as tolerated         Precautions: universal    Manuals 2022          IASTM    B UT B UT        (-) pressure STM                                       Neuro Re-Ed             Scap Retraction HEP 5" 15x 5" 15x 5" 15x         Bridge  3" 3x10 3" 3x10 3" 3x10         Hip Ext  3" 3x10 3" 3x10 3" 3x10         TB Row  3x10 CC 30# 3x10 CC 30# 3x10 CC 30# 3x10 CC 30#        SL Hip Abd   3x10 3x10 3x10        SLR   3x10 3x10 3x10        PNF D2 Flex BL    YTB 2x10  YTB 2x10 BL        Horz Abd    YTB 2x10  YTB 2x10                                  Ther Ex             UT Stretch HEP 30" 3x 30" 3x  30" 3x 30" 3x        LS Stretch HEP 30" 3x 30" 3x 30" 3x 30" 3x        UBE             Bike             Open Book  10x BL 10x BL 10x BL 10x BL        T-Spine Series  Flex/Abd, Hugs 10x ea Flex/Abd/Hugs 10x ea Flex/Abd/Hugs 10x ea         T-Spine Halo     1x10 BL        Rhomboid Stretch     30" 3x         Prone Press Up  2x10 2x10          Seated T-Spine Rot     2x10 bl        Ther Activity                                       Gait Training Modalities

## 2022-07-25 ENCOUNTER — OFFICE VISIT (OUTPATIENT)
Dept: PHYSICAL THERAPY | Facility: MEDICAL CENTER | Age: 22
End: 2022-07-25
Payer: COMMERCIAL

## 2022-07-25 DIAGNOSIS — M54.2 NECK PAIN: ICD-10-CM

## 2022-07-25 DIAGNOSIS — G89.29 CHRONIC THORACIC BACK PAIN, UNSPECIFIED BACK PAIN LATERALITY: Primary | ICD-10-CM

## 2022-07-25 DIAGNOSIS — M54.6 CHRONIC THORACIC BACK PAIN, UNSPECIFIED BACK PAIN LATERALITY: Primary | ICD-10-CM

## 2022-07-25 PROCEDURE — 97140 MANUAL THERAPY 1/> REGIONS: CPT

## 2022-07-25 PROCEDURE — 97110 THERAPEUTIC EXERCISES: CPT

## 2022-07-25 NOTE — PROGRESS NOTES
Daily Note     Today's date: 2022  Patient name: Donna Dean  : 2000  MRN: [id number]  Referring provider: Wali France MD  Dx:   Encounter Diagnosis     ICD-10-CM    1  Chronic thoracic back pain, unspecified back pain laterality  M54 6     G89 29    2  Neck pain  M54 2        Start Time: 1644          Subjective: Pt states she has had an increase in low back pain  She denies a GALINA, she denies an increase in activity  Objective: See treatment diary below      Assessment: Tolerated treatment well  Minor cues requires throughout session for decreased lumbar compensation/increased rotation with exercise this session  Patient demonstrated fatigue post treatment and would benefit from continued PT      Plan: Continue per plan of care  Progress treatment as tolerated         Precautions: universal    Manuals 2022         IASTM    B UT B UT B UT       (-) pressure STM                                       Neuro Re-Ed             Scap Retraction HEP 5" 15x 5" 15x 5" 15x         Bridge  3" 3x10 3" 3x10 3" 3x10  3" 3x10       Hip Ext  3" 3x10 3" 3x10 3" 3x10         TB Row  3x10 CC 30# 3x10 CC 30# 3x10 CC 30# 3x10 CC 30# 3x10 CC 30#       SL Hip Abd   3x10 3x10 3x10        SLR   3x10 3x10 3x10        PNF D2 Flex BL    YTB 2x10  YTB 2x10 BL YTB 2x10 BL       Horz Abd    YTB 2x10  YTB 2x10 YTB 2x10                                 Ther Ex             UT Stretch HEP 30" 3x 30" 3x  30" 3x 30" 3x 30" 3x       LS Stretch HEP 30" 3x 30" 3x 30" 3x 30" 3x 30" 3x       UBE             Bike             Open Book  10x BL 10x BL 10x BL 10x BL 10x BL       T-Spine Series  Flex/Abd, Hugs 10x ea Flex/Abd/Hugs 10x ea Flex/Abd/Hugs 10x ea         T-Spine Halo     1x10 BL 1x10 BL       Rhomboid Stretch     30" 3x  30" 3x       Prone Press Up  2x10 2x10          Seated T-Spine Rot     2x10 bl 2x10 BL        Ther Activity Gait Training                                       Modalities

## 2022-07-27 ENCOUNTER — OFFICE VISIT (OUTPATIENT)
Dept: PHYSICAL THERAPY | Facility: MEDICAL CENTER | Age: 22
End: 2022-07-27
Payer: COMMERCIAL

## 2022-07-27 ENCOUNTER — HOSPITAL ENCOUNTER (OUTPATIENT)
Dept: RADIOLOGY | Facility: MEDICAL CENTER | Age: 22
Discharge: HOME | End: 2022-07-27
Payer: COMMERCIAL

## 2022-07-27 DIAGNOSIS — M54.6 CHRONIC THORACIC BACK PAIN, UNSPECIFIED BACK PAIN LATERALITY: Primary | ICD-10-CM

## 2022-07-27 DIAGNOSIS — M54.2 NECK PAIN: ICD-10-CM

## 2022-07-27 DIAGNOSIS — G89.29 CHRONIC THORACIC BACK PAIN, UNSPECIFIED BACK PAIN LATERALITY: Primary | ICD-10-CM

## 2022-07-27 DIAGNOSIS — R10.2 PELVIC PAIN: ICD-10-CM

## 2022-07-27 PROCEDURE — 76856 US EXAM PELVIC COMPLETE: CPT

## 2022-07-27 PROCEDURE — 97110 THERAPEUTIC EXERCISES: CPT

## 2022-07-27 PROCEDURE — 97140 MANUAL THERAPY 1/> REGIONS: CPT

## 2022-07-27 PROCEDURE — 76830 TRANSVAGINAL US NON-OB: CPT

## 2022-07-27 PROCEDURE — 97112 NEUROMUSCULAR REEDUCATION: CPT

## 2022-07-27 NOTE — PROGRESS NOTES
Daily Note     Today's date: 2022  Patient name: Donna Saenz  : 2000  MRN: [id number]  Referring provider: Bard Talia MD  Dx:   Encounter Diagnosis     ICD-10-CM    1  Chronic thoracic back pain, unspecified back pain laterality  M54 6     G89 29    2  Neck pain  M54 2        Start Time: 1540  Stop Time: 1625  Total time in clinic (min): 45 minutes    Subjective: Pt states an improvement in low back symptoms this week  She states her neck has significantly improved  She states rib pain has increased at work today  Objective: See treatment diary below      Assessment: Tolerated treatment well  Patient demonstrated fatigue post treatment, exhibited good technique with therapeutic exercises and would benefit from continued PT      Plan: Continue per plan of care  Progress treatment as tolerated         Precautions: universal    PT 1:1 [id number]  Manuals 2022        IASTM    B UT B UT B UT B UT      (-) pressure STM             Jt Mob       T-Spine PA mob, rib springing                   Neuro Re-Ed             Scap Retraction HEP 5" 15x 5" 15x 5" 15x         Bridge  3" 3x10 3" 3x10 3" 3x10  3" 3x10 3" 3x10      Hip Ext  3" 3x10 3" 3x10 3" 3x10         TB Row  3x10 CC 30# 3x10 CC 30# 3x10 CC 30# 3x10 CC 30# 3x10 CC 30# 3x10 CC 40#      SL Hip Abd   3x10 3x10 3x10        SLR   3x10 3x10 3x10        PNF D2 Flex BL    YTB 2x10  YTB 2x10 BL YTB 2x10 BL RTB 2x10 BL      Horz Abd    YTB 2x10  YTB 2x10 YTB 2x10 RTB 2x10      Palloff Press       RTB 2x10 BL 3"                   Ther Ex             UT Stretch HEP 30" 3x 30" 3x  30" 3x 30" 3x 30" 3x 30" 3x      LS Stretch HEP 30" 3x 30" 3x 30" 3x 30" 3x 30" 3x 30" 3x      UBE             Bike             Open Book  10x BL 10x BL 10x BL 10x BL 10x BL 10x BL      T-Spine Series  Flex/Abd, Hugs 10x ea Flex/Abd/Hugs 10x ea Flex/Abd/Hugs 10x ea         T-Spine Halo     1x10 BL 1x10 BL 1x10 BL      Rhomboid Stretch     30" 3x  30" 3x 30" 3x      Prone Press Up  2x10 2x10          Seated T-Spine Rot     2x10 bl 2x10 BL  2x10 BL      Ther Activity                                       Gait Training                                       Modalities

## 2022-07-29 NOTE — LETTER
May 24, 2022     Ravillnuha Duty, [facility]  [address]    Patient: Araceli Brandt   YOB: 2000   Date of Visit: 5/24/2022       Dear Dr Valeria Lafleur:    Thank you for referring Araceli Brandt to me for evaluation  Below are my notes for this consultation  If you have questions, please do not hesitate to call me  I look forward to following your patient along with you  Sincerely,        Clementine Martinez MD        CC: No Recipients  Clementine Martinez MD  5/24/2022  3:26 PM  Sign when Signing Visit  Consultation - Infectious Disease   Araceli Brandt 22 y o  female MRN: [id number]  Unit/Bed#:  Encounter: [id number]      IMPRESSION & RECOMMENDATIONS:   1  EBV associated mononucleosis  Patient formally diagnosed in January  Serological testing consistent with exposure in the past   Patient presented with systemic complaints and on other evaluations at urgent care/ED she had various times was noted to have lymphadenopathy  Recent imaging without splenomegaly  Patient however still with persistent systemic symptoms of fatigue, headache, disturbances and sleep  We had detailed discussion about shift in focus to supportive measures  No role for antiviral therapy  Would not recommend repeat testing  No need for repeat imaging at this point  Recommend evaluation by Nutrition, to optimize diet  Recommend initiation of exercise program/PT evaluation  Recommend evaluation by chiropractor or OMM provider given headaches  Continue to follow with chronic providers such as GYN  Recommend re-engaging with care with [id number], with current situation  Continue regular screening otherwise with PCP  RTO PRN    Above plan discussed in detail with the patient and with her grandmother    Will forward office visit to primary care provider    I have spent 50 minutes with Patient and family today in which greater than 50% of this time was spent in counseling/coordination of care regarding Diagnostic results, Risks and benefits of tx options, Intructions for management, Patient and family education and Impressions  HISTORY OF PRESENT ILLNESS:  Reason for Consult:  Monosyndrome with EBV and persistent symptoms  HPI: Jennifer Portillo is a 22y o  year old female with dysmenorrhea, prior bipolar with depressive symptoms but has been successfully off medication for many years  Patient is being referred to our office for concerns for persistent viral infection leading to persistent symptoms  Patient was diagnosed with mono formally in January by her primary care provider  Unfortunately only labs available from that time and no office visits  She reported essentially having body aches, fatigue, headaches, issues with sleep, swollen lymph nodes and feelings of a swollen spleen  Since that time she has been seen at urgent care visits and in the emergency department for various episodes of recurrence of symptoms  Recent images reviewed including CT scan and ultrasound which do not show any persistent lymphadenopathy or splenomegaly  Patient reports that she remains on light duty at work  There was a period of time that she was essentially in her bed and had no energy with persistent fatigue  She is sexually active with only 1 partner  She reports previous testing for HIV and hepatitis were unremarkable and has no concerns requiring repeat testing at this time  Denies other STI history  Patient reports working at [employer] and has not attended school  She does have a new tattoo  Patient reports having a dog but denies having any exotic pets or animals or exposure to farm animals  She denies ingesting raw meat/fish on a regular basis  Denies any progressive or persistent GI symptoms  Patient provided list of organized questions along with symptoms  Grandmother present for our evaluation  I reviewed with them the patient's prior labs indicating exposure to EBV    We discussed in detail the latency associated with EBV integration into the immune system  We discussed that this is a virus that can essentially remain dormant with episodes of reactivation during times of stress  We also discussed the possibility of persistent symptoms from prior infection  I reviewed with them in detail that there is no active role for antiviral therapy and that is generally reserved for transplant population  I discussed with them that going forward it would be of benefit to seek supportive measures that could potentially improve patient's overall day-to-day function  We discussed that there is no role for repeat testing  We discussed seeking a nutrition evaluation to optimize diet  We discussed seeking out things like chiropractor/OMM provider as the patient is reporting headaches which may improve with adjustments  We also discussed nontraditional therapies such as acupuncture and massage  Lastly we talked about regular exercise/starting an exercise regimen or even seeing folks like physical therapy  I stressed to the patient that her symptoms may have truly been from prior illness and unfortunately she may have relapses or recurrences in the future or infections with other viruses which can lead to similar symptoms  She expressed understanding and her grandmother expressed the same  Lastly I did stress to her about considering other diagnoses/issues that may be contributing  I recommended that she continue to follow with her GYN provider  I also discussed with her about re-engaging with mental health providers  Her current episode of illness may be leading to some situational depression which she could benefit from therapy or possibly medications for brief period  She expressed understanding  I let her know that we will send these recommendations to her primary    Additionally we discussed at the moment no formal follow-up is required in the ID office but she can return if there are further questions from her primary provider  REVIEW OF SYSTEMS:  A complete 12 point system-based review of systems is otherwise negative  PAST MEDICAL HISTORY:  Past Medical History:   Diagnosis Date    Mononucleosis      No past surgical history on file  FAMILY HISTORY:  Non-contributory    SOCIAL HISTORY:  Social History   Social History     Substance and Sexual Activity   Alcohol Use Not Currently    Comment: rarely     Social History     Substance and Sexual Activity   Drug Use Not Currently    Types: Marijuana    Comment: socially     Social History     Tobacco Use   Smoking Status Former Smoker    Types: Cigarettes   Smokeless Tobacco Never Used   Tobacco Comment    quit 2-3 years       ALLERGIES:  No Known Allergies    MEDICATIONS:  All current active medications have been reviewed  Antibiotics:    PHYSICAL EXAM:  Vitals:    05/24/22 1241   BP: 114/74   BP Location: Right arm   Patient Position: Sitting   Cuff Size: Adult   Pulse: 94   Resp: 18   Temp: 97 9 °F (36 6 °C)   TempSrc: Temporal   SpO2: 99%   Weight: 64 5 kg (142 lb 3 2 oz)   Height: 5' 4" (1 626 m)         General Appearance:  Appearing well, nontoxic, and in no distress; patient is attentive and is able to provide accurate history  Head:  Normocephalic, without obvious abnormality, atraumatic   Eyes:  Conjunctiva pink and sclera anicteric, both eyes   Nose: Nares normal, mucosa normal, no drainage   Throat: Oropharynx moist without lesions; no tonsillar enlargement appreciated   Neck: Supple, symmetrical, no tenderness/mass/nodules; a few subcentimeter lymph nodes palpated under the right submandibular area which are largely nontender     Back:   Symmetric, no curvature, ROM normal, no CVA tenderness   Lungs:   Clear to auscultation bilaterally, respirations unlabored   Chest Wall:  No tenderness or deformity   Heart:  RRR; no murmur, rub or gallop   Abdomen:   Soft, non-tender, non-distended, positive bowel sounds Extremities: No cyanosis, clubbing or edema   Skin: No rashes or lesions  No draining wounds noted  Lymph nodes: Cervical, supraclavicular nodes normal   Neurologic: Alert and oriented times 3, patient was able to ambulate unassisted  Freely moving her extremities against gravity  LABS, IMAGING, & OTHER STUDIES:  Lab Results:  I have personally reviewed pertinent labs  Lab Results   Component Value Date    K 4 0 05/04/2022     05/04/2022    CO2 24 05/04/2022    BUN 15 05/04/2022    CREATININE 0 74 05/04/2022    CALCIUM 9 3 05/04/2022    AST 17 05/04/2022    ALT 43 05/04/2022    ALKPHOS 44 05/04/2022    EGFR 116 05/04/2022     Lab Results   Component Value Date    WBC 14 03 (H) 05/04/2022    HGB 14 6 05/04/2022    HCT 43 9 05/04/2022    MCV 98 05/04/2022     (H) 05/04/2022   No results found for: SEDRATE      Imaging Studies:   I have personally reviewed pertinent imaging study reports and images in PACS  Other Studies:   I have personally reviewed pertinent reports  endoscopy

## 2022-08-01 ENCOUNTER — OFFICE VISIT (OUTPATIENT)
Dept: PHYSICAL THERAPY | Facility: MEDICAL CENTER | Age: 22
End: 2022-08-01
Payer: COMMERCIAL

## 2022-08-01 DIAGNOSIS — M54.6 CHRONIC THORACIC BACK PAIN, UNSPECIFIED BACK PAIN LATERALITY: Primary | ICD-10-CM

## 2022-08-01 DIAGNOSIS — M54.2 NECK PAIN: ICD-10-CM

## 2022-08-01 DIAGNOSIS — G89.29 CHRONIC THORACIC BACK PAIN, UNSPECIFIED BACK PAIN LATERALITY: Primary | ICD-10-CM

## 2022-08-01 PROCEDURE — 97140 MANUAL THERAPY 1/> REGIONS: CPT

## 2022-08-01 PROCEDURE — 97110 THERAPEUTIC EXERCISES: CPT

## 2022-08-01 PROCEDURE — 97112 NEUROMUSCULAR REEDUCATION: CPT

## 2022-08-01 NOTE — PROGRESS NOTES
Daily Note     Today's date: 2022  Patient name: Reid Mayfield  : 2000  MRN: [id number]  Referring provider: Mary Braxton MD  Dx:   Encounter Diagnosis     ICD-10-CM    1  Chronic thoracic back pain, unspecified back pain laterality  M54 6     G89 29    2  Neck pain  M54 2        Start Time: 1730  Stop Time: 1808  Total time in clinic (min): 38 minutes    Subjective: Pt states her neck is feeling better  She states feeling very tired today, due to a long day yesterday  Objective: See treatment diary below      Assessment: Tolerated treatment well  Patient demonstrated fatigue post treatment, exhibited good technique with therapeutic exercises and would benefit from continued PT      Plan: Continue per plan of care  Progress treatment as tolerated         Precautions: universal    PT 1:1  Manuals 2022       IASTM    B UT B UT B UT B UT B UT     (-) pressure STM             Jt Mob       T-Spine PA mob, rib springing                   Neuro Re-Ed             Scap Retraction HEP 5" 15x 5" 15x 5" 15x         Bridge  3" 3x10 3" 3x10 3" 3x10  3" 3x10 3" 3x10 3" 3x10     Hip Ext  3" 3x10 3" 3x10 3" 3x10         TB Row  3x10 CC 30# 3x10 CC 30# 3x10 CC 30# 3x10 CC 30# 3x10 CC 30# 3x10 CC 40# 3x10 CC 40#     SL Hip Abd   3x10 3x10 3x10        SLR   3x10 3x10 3x10        PNF D2 Flex BL    YTB 2x10  YTB 2x10 BL YTB 2x10 BL RTB 2x10 BL RTB 2x10 BL     Horz Abd    YTB 2x10  YTB 2x10 YTB 2x10 RTB 2x10 RTB 2x10     Palloff Press       RTB 2x10 BL 3" RTB 2x10 Bl 3"                  Ther Ex             UT Stretch HEP 30" 3x 30" 3x  30" 3x 30" 3x 30" 3x 30" 3x 30" 3x BL     LS Stretch HEP 30" 3x 30" 3x 30" 3x 30" 3x 30" 3x 30" 3x 30" 3x BL     UBE             Bike             Open Book  10x BL 10x BL 10x BL 10x BL 10x BL 10x BL 10x BL     T-Spine Series  Flex/Abd, Hugs 10x ea Flex/Abd/Hugs 10x ea Flex/Abd/Hugs 10x ea T-Spine Halo     1x10 BL 1x10 BL 1x10 BL 1x10 BL     Rhomboid Stretch     30" 3x  30" 3x 30" 3x 30" 3x     Prone Press Up  2x10 2x10          Seated T-Spine Rot     2x10 bl 2x10 BL  2x10 BL 2x10 BL     Ther Activity                                       Gait Training                                       Modalities

## 2022-08-03 ENCOUNTER — OFFICE VISIT (OUTPATIENT)
Dept: PHYSICAL THERAPY | Facility: MEDICAL CENTER | Age: 22
End: 2022-08-03
Payer: COMMERCIAL

## 2022-08-03 DIAGNOSIS — M54.2 NECK PAIN: ICD-10-CM

## 2022-08-03 DIAGNOSIS — M54.6 CHRONIC THORACIC BACK PAIN, UNSPECIFIED BACK PAIN LATERALITY: Primary | ICD-10-CM

## 2022-08-03 DIAGNOSIS — G89.29 CHRONIC THORACIC BACK PAIN, UNSPECIFIED BACK PAIN LATERALITY: Primary | ICD-10-CM

## 2022-08-03 PROCEDURE — 97110 THERAPEUTIC EXERCISES: CPT

## 2022-08-03 PROCEDURE — 97140 MANUAL THERAPY 1/> REGIONS: CPT

## 2022-08-03 NOTE — PROGRESS NOTES
PT Re-Evaluation  and PT Discharge    Today's date: 8/3/2022  Patient name: Nargis Yancey  : 2000  MRN: [id number]  Referring provider: Fer Soto MD  Dx:   Encounter Diagnosis     ICD-10-CM    1  Chronic thoracic back pain, unspecified back pain laterality  M54 6     G89 29    2  Neck pain  M54 2        Start Time: 1269  Stop Time: 2777  Total time in clinic (min): 37 minutes    Assessment  Assessment details: Nargis Yancey is a 22y/o female who presents to OP PT with a a referral from Dr Anat Montes with a medical diagnosis of neck, back and wrist pain  Pt has attended 9 sessions of skilled PT services to date  Patient has progressed toward all pt and PT stated goals  Patient demonstrates decreased restrictions in cervical and lumbar ROM  Improved overall strength and endurance  At this time patient is appropriate for skilled PT discharge to [facility]  Patient agrees to this plan at this time  Impairments: abnormal muscle firing, abnormal muscle tone, abnormal or restricted ROM, activity intolerance, impaired physical strength, lacks appropriate home exercise program, pain with function, weight-bearing intolerance and poor body mechanics    Symptom irritability: moderateUnderstanding of Dx/Px/POC: good   Prognosis: good    Goals  STG 2-6 weeks  1  Improve MMT by 1/2 muscle grade in all deficient planes  MET  2  Report 2-3 point decrease on NPRS  MET  3  Improve cervical ROM by 25%  MET    LTG 6 weeks to discharge  1  Improve MMT to at least 4/5 in all deficient planes  Progressing  2  Improve lumbar restrictions to minimal MET  3  Return to PLOF in all recreational activities, work duties and ADLs    MET    Plan  Patient would benefit from: PT eval and skilled physical therapy  Planned modality interventions: biofeedback, high voltage pulsed current: pain management, high voltage pulsed current: spasm management, thermotherapy: hydrocollator packs, thermotherapy: paraffin bath and electrical stimulation/Russian stimulation  Planned therapy interventions: abdominal trunk stabilization, activity modification, joint mobilization, IADL retraining, manual therapy, massage, Mooney taping, ADL retraining, ADL training, balance/weight bearing training, orthotic management and training, postural training, body mechanics training, breathing training, balance, self care, strengthening, stretching, therapeutic activities, therapeutic exercise, therapeutic training, transfer training, functional ROM exercises, fine motor coordination training, flexibility, gait training, graded exercise, graded activity, graded motor and home exercise program  Frequency: 2x week  Plan of Care beginning date: 7/5/2022  Plan of Care expiration date: 9/27/2022        Subjective Evaluation    History of Present Illness  Mechanism of injury: Subjective: Pt states significant improvement in cervical symptoms  She states pain hs reduced to a point where even if she has pain, it is tolerable  She no longer experiences significant issues at work and is able to perform work duties without issue  She is currently working with a  and denies issues  At this time patient states she would like to transition to [facility]  Patient previously diagnosed with mononucleosis this year  She states she spent a lot of time in bed and developed an annoying low back and neck pain  She states since returning to work as a  at [employer] she has noticed an increase in right hand numbness and tingling  She states she continues to sleep on her back  She denies paresthesias       Pain  Type: constant throb  Current: 0/10  Best: 0/10  Worst: 3/10  Alleviates: none  Aggravates: posture, work, sleep, rest    Occupation:  at [employer]    Imaging:  n/a    PMH: BMI,     Previous Surgeries: n/a    Previous Physical Activity: occasionally works out at [employer]    Current Medications: see file    GALINA: insidious    Surgery Date: n/a     MD: Dr Melissa Hyatt    Patient Goals: to be out of pain               Objective     Concurrent Complaints  Negative for night pain, disturbed sleep, bladder dysfunction, bowel dysfunction and saddle (S4) numbness    Palpation   Left   Hypertonic in the upper trapezius  Hypotonic in the lower trapezius, lumbar paraspinals and middle trapezius  Tenderness of the cervical paraspinals and suboccipitals  Trigger point to cervical paraspinals  Right   Hypertonic in the upper trapezius  Hypotonic in the lower trapezius, lumbar paraspinals and middle trapezius  Tenderness of the cervical paraspinals, quadratus lumborum and suboccipitals  Trigger point to cervical paraspinals and upper trapezius       Active Range of Motion   Cervical/Thoracic Spine       Cervical    Flexion:  Restriction level: minimal  Extension:  Restriction level: minimal  Left lateral flexion:  Restriction level: minimal  Right lateral flexion:  Restriction level minimal  Left rotation:  Restriction level: minimal  Right rotation:  Restriction level: minimal    Lumbar   Flexion:  Restriction level: minimal  Extension:  Restriction level: minimal  Left lateral flexion:  Restriction level: minimal  Right lateral flexion:  Restriction level: minimal  Left rotation:  Restriction level: minimal  Right rotation:  Restriction level: minimal    Joint Play     Pain: C6, C7, T1, T2, T3, T4, T5, T6, T7, T8, T9, T10, T11, T12, L1, L2, L3, L4, L5 and S1     Strength/Myotome Testing     Left Shoulder     Planes of Motion   Flexion: 4   Extension: 4   Abduction: 4   External rotation at 0°: 4-   Internal rotation at 0°: 4-     Isolated Muscles   Lower trapezius: 3+   Middle trapezius: 3+     Right Shoulder     Planes of Motion   Flexion: 4   Extension: 4   Abduction: 4-   External rotation at 0°: 4   Internal rotation at 0°: 4     Isolated Muscles   Lower trapezius: 3+   Middle trapezius: 3+     Left Elbow   Flexion: 4-  Extension: 3+    Right Elbow   Flexion: 4  Extension: 3+    Left Hip   Planes of Motion   Flexion: 3  Extension: 3  Abduction: 3  External rotation: 3  Internal rotation: 3    Right Hip   Planes of Motion   Flexion: 3  Extension: 3  Abduction: 3  External rotation: 3  Internal rotation: 3    Left Knee   Flexion: 3  Extension: 3    Right Knee   Flexion: 3  Extension: 3    Left Ankle/Foot   Dorsiflexion: 3  Plantar flexion: 3    Right Ankle/Foot   Dorsiflexion: 3  Plantar flexion: 3    Tests     Lumbar     Left   Negative crossed SLR and passive SLR  Right   Negative crossed SLR and passive SLR                Precautions: universal    Manuals 7/5/2022   7/11/2022   7/13/2022   7/18/2022   7/20/2022   7/25/2022   7/27/2022   8/1/2022   8/3/2022      IASTM    B UT B UT B UT B UT B UT B UT    (-) pressure STM             Jt Mob       T-Spine PA mob, rib springing                   Neuro Re-Ed             Scap Retraction HEP 5" 15x 5" 15x 5" 15x         Bridge  3" 3x10 3" 3x10 3" 3x10  3" 3x10 3" 3x10 3" 3x10 3" 3x10    Hip Ext  3" 3x10 3" 3x10 3" 3x10         TB Row  3x10 CC 30# 3x10 CC 30# 3x10 CC 30# 3x10 CC 30# 3x10 CC 30# 3x10 CC 40# 3x10 CC 40# 3x10 CC 40#    SL Hip Abd   3x10 3x10 3x10        SLR   3x10 3x10 3x10        PNF D2 Flex BL    YTB 2x10  YTB 2x10 BL YTB 2x10 BL RTB 2x10 BL RTB 2x10 BL GTB 2x10 BL    Horz Abd    YTB 2x10  YTB 2x10 YTB 2x10 RTB 2x10 RTB 2x10 GTB 2x10    Palloff Press       RTB 2x10 BL 3" RTB 2x10 Bl 3" GTB 2x10 BL 3"                 Ther Ex             UT Stretch HEP 30" 3x 30" 3x  30" 3x 30" 3x 30" 3x 30" 3x 30" 3x BL 30" 3x BL    LS Stretch HEP 30" 3x 30" 3x 30" 3x 30" 3x 30" 3x 30" 3x 30" 3x BL 30" 3x BL    UBE             Bike             Open Book  10x BL 10x BL 10x BL 10x BL 10x BL 10x BL 10x BL 10x BL    T-Spine Series  Flex/Abd, Hugs 10x ea Flex/Abd/Hugs 10x ea Flex/Abd/Hugs 10x ea         T-Spine Halo     1x10 BL 1x10 BL 1x10 BL 1x10 BL 1x10 BL    Rhomboid Stretch     30" 3x  30" 3x 30" 3x 30" 3x 30" 3x    Prone Press Up  2x10 2x10          Seated T-Spine Rot     2x10 bl 2x10 BL  2x10 BL 2x10 BL 2x10 BL    Ther Activity                                       Gait Training                                       Modalities

## 2022-08-04 ENCOUNTER — EVALUATION (OUTPATIENT)
Dept: OCCUPATIONAL THERAPY | Facility: MEDICAL CENTER | Age: 22
End: 2022-08-04
Payer: COMMERCIAL

## 2022-08-04 DIAGNOSIS — M25.531 BILATERAL WRIST PAIN: Primary | ICD-10-CM

## 2022-08-04 DIAGNOSIS — M25.532 BILATERAL WRIST PAIN: Primary | ICD-10-CM

## 2022-08-04 PROCEDURE — 97165 OT EVAL LOW COMPLEX 30 MIN: CPT

## 2022-08-04 PROCEDURE — 97530 THERAPEUTIC ACTIVITIES: CPT

## 2022-08-04 NOTE — PROGRESS NOTES
OT Evaluation     Today's date: 2022  Patient name: Franklin Sorto  : 2000  MRN: [id number]  Referring provider: Juancarlos Campos MD  Dx: No diagnosis found  Assessment  Assessment details: Pt presents to OT evaluation on 2022  Worsening symptoms in the R hand  Pain, numbness and tingling  Pain on ulnar side of the R hand from elbow into the pinky  More numbness reported through D2, D3, and D5  Wraps an ace bandage around the wrist when in a lot of pain  Pain is worse at work (cleaning, typing, lifting)  More pain when driving  Pt demonstrated mild soft tissue tightness through forearm flexors and extensors on R hand today  Slightly limited wrist extension on R when compared to L  No edema present  Decreased  strength on R when compared to L  Pt tests positive for Tinel's at wrist indicative of carpal tunnel, however, pt reports numbness in not consistent with first three fingers  Tenderness with palpation of ulnar sided wrist and forearm R>L  Pt was fabricated wrist widget today to trial on R  Educated pt on work modification techniques to reduce common overuse  Provided pt with information for [facility] orthopedic hand speciality  Pt will be seen for OT services 1-2x/wk for 12 weeks to improve ability to complete work-related tasks  POC was reviewed with the pt, pt understands and agrees with POC  Impairments: abnormal or restricted ROM, activity intolerance, impaired physical strength, lacks appropriate home exercise program and pain with function    Goals  STG:  - Pt will exhibit understanding and demonstrate carry over of HEP   - Pt will begin to verbally report a decrease in pain from time of initial evaluation with use of modalities  - Pt will demonstrate compliance with brace wearing schedule  - Pt will demonstrate understanding for work related modifications     - Pt will improve wrist extension ROM by 3+   - Pt will improve  strength by 3+ lbs  LTG:  - Pt will exhibit improvements of ROM to WNL for increased independence during work related tasks  - Pt will increase by  strength to WNL when compared to initial evaluation for improved participation in work related tasks  - Pt will consistently report decreased pain when compared to initial evaluation   - Pt will report feeling >75% back to normal for increased independence in daily routine and tasks  - Pt will express readiness for discharge with improved independence  Plan  Patient would benefit from: skilled occupational therapy  Planned modality interventions: thermotherapy: hydrocollator packs  Planned therapy interventions: neuromuscular re-education, stretching, strengthening, therapeutic activities, therapeutic exercise, patient education, graded exercise, graded activity, home exercise program, graded motor and functional ROM exercises  Frequency: 1x week  Duration in weeks: 12  Plan of Care beginning date: 2022  Plan of Care expiration date: 10/27/2022        Subjective Evaluation    History of Present Illness  Mechanism of injury: Pt presents to OT evaluation on 2022  Worsening symptoms in the R hand  Pain, numbness and tingling  Pain on ulnar side of the R hand from elbow into the pinky  More numbness reported through D2, D3, and D5  Wraps an ace bandage around the wrist when in a lot of pain  Pain is worse at work (cleaning, typing, lifting)  More pain when driving  L hand: pain in the wrist  History of fracture  About a year ago "heard a pop" and ever since then it has occasionally hurt  Has not seen an orthopedic doctor  Hand Dominant: R    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    Quality of life: good    Pain  Current pain ratin  At best pain ratin  At worst pain ratin  Relieving factors: ice    Hand dominance: right    Patient Goals  Patient goals for therapy: independence with ADLs/IADLs, return to sport/leisure activities, return to work, increased motion, decreased pain, decreased edema and increased strength          Objective     Tenderness     Additional Tenderness Details  Ulnar sided wrist and forearm L/R, R>L    Passive Range of Motion     Left Wrist   Wrist flexion: 68 degrees   Wrist extension: 92 degrees   Radial deviation: 15 degrees   Ulnar deviation: 40 degrees     Right Wrist   Wrist flexion: 68 degrees   Wrist extension: 86 degrees   Radial deviation: 15 degrees   Ulnar deviation: 45 degrees     Strength/Myotome Testing     Left Wrist/Hand      (2nd hand position)     Trial 1: 40    Thumb Strength  Key/Lateral Pinch     Trial 1: 15  Tip/Two-Point Pinch     Trial 1: 9  Palmar/Three-Point Pinch     Trial 1: 10    Right Wrist/Hand      (2nd hand position)     Trial 1: 32    Thumb Strength   Key/Lateral Pinch     Trial 1: 13  Tip/Two-Point Pinch     Trial 1: 9  Palmar/Three-Point Pinch     Trial 1: 10    Tests     Left Elbow   Negative Tinel's sign (cubital tunnel)  Right Elbow   Negative Tinel's sign (cubital tunnel)  Left Wrist/Hand   Negative Phalen's sign, TFCC load and Tinel's sign (medial nerve)  Right Wrist/Hand   Positive Tinel's sign (medial nerve)  Negative Phalen's sign and TFCC load  Swelling     Left Wrist/Hand   Circumference wrist: 15 5 cm    Right Wrist/Hand   Circumference wrist: 15 5 cm             Precautions: Universal    Manuals HEP                        Ther Ex  10 min education   Education on HEP and dx x5 min     AROM tendon glides x10    AROM table top extension x10    AROM digit add/abduction x10    AROM wrist flex/ext/RD/UD x10    AROM forearm rotation x10    PROM wrist flex/ext X3 10 sec    PROM ulnar/radial deviation                              Modalities     Heat 10-15 min 5 min          Therapist supervised patient with use of modalities during today's treatment session  Skin integrity was assessed and appeared normal following use of modalities      Assessment:   Pt presents to OT evaluation on 8/4/2022  Worsening symptoms in the R hand  Pain, numbness and tingling  Pain on ulnar side of the R hand from elbow into the pinky  More numbness reported through D2, D3, and D5  Wraps an ace bandage around the wrist when in a lot of pain  Pain is worse at work (cleaning, typing, lifting)  More pain when driving  Pt demonstrated mild soft tissue tightness through forearm flexors and extensors on R hand today  Slightly limited wrist extension on R when compared to L  No edema present  Decreased  strength on R when compared to L  Pt tests positive for Tinel's at wrist indicative of carpal tunnel, however, pt reports numbness in not consistent with first three fingers  Tenderness with palpation of ulnar sided wrist and forearm R>L  Pt was fabricated wrist widget today to trial on R  Educated pt on work modification techniques to reduce common overuse  Provided pt with information for [facility] orthopedic hand speciality  Pt will be seen for OT services 1-2x/wk for 12 weeks to improve ability to complete work-related tasks  POC was reviewed with the pt, pt understands and agrees with POC  Plan:   Focus on ROM and strengthening to improve ability to complete daily activites with ease    POC 8/4/2022-10/27/2022

## 2022-08-04 NOTE — LETTER
2022    Velia Lowe, [address] [facility]  [address]    Patient: Ramirez Torres   YOB: 2000   Date of Visit: 2022     Encounter Diagnosis     ICD-10-CM    1  Bilateral wrist pain  M25 531     M25 532        Dear Dr Malik Gloria:    Thank you for your recent referral of Ramirez Torres  Please review the attached evaluation summary from Ligia's recent visit  Please verify that you agree with the plan of care by signing the attached order  If you have any questions or concerns, please do not hesitate to call  I sincerely appreciate the opportunity to share in the care of one of your patients and hope to have another opportunity to work with you in the near future  Sincerely,    Sugey Ruffin, OT      Referring Provider:     I certify that I have read the below Plan of Care and certify the need for these services furnished under this plan of treatment while under my care  Velia Lowe MD  [phone] [place] [address]  Via Fax: [phone]        OT Evaluation     Today's date: 2022  Patient name: Ramirez Torres  : 2000  MRN: [id number]  Referring provider: Selin Pires MD  Dx: No diagnosis found  Assessment  Assessment details: Pt presents to OT evaluation on 2022  Worsening symptoms in the R hand  Pain, numbness and tingling  Pain on ulnar side of the R hand from elbow into the pinky  More numbness reported through D2, D3, and D5  Wraps an ace bandage around the wrist when in a lot of pain  Pain is worse at work (cleaning, typing, lifting)  More pain when driving  Pt demonstrated mild soft tissue tightness through forearm flexors and extensors on R hand today  Slightly limited wrist extension on R when compared to L  No edema present  Decreased  strength on R when compared to L   Pt tests positive for Tinel's at wrist indicative of carpal tunnel, however, pt reports numbness in not consistent with first three fingers  Tenderness with palpation of ulnar sided wrist and forearm R>L  Pt was fabricated wrist widget today to trial on R  Educated pt on work modification techniques to reduce common overuse  Provided pt with information for [facility] orthopedic hand speciality  Pt will be seen for OT services 1-2x/wk for 12 weeks to improve ability to complete work-related tasks  POC was reviewed with the pt, pt understands and agrees with POC  Impairments: abnormal or restricted ROM, activity intolerance, impaired physical strength, lacks appropriate home exercise program and pain with function    Goals  STG:  - Pt will exhibit understanding and demonstrate carry over of HEP   - Pt will begin to verbally report a decrease in pain from time of initial evaluation with use of modalities  - Pt will demonstrate compliance with brace wearing schedule  - Pt will demonstrate understanding for work related modifications  - Pt will improve wrist extension ROM by 3+   - Pt will improve  strength by 3+ lbs  LTG:  - Pt will exhibit improvements of ROM to WNL for increased independence during work related tasks  - Pt will increase by  strength to WNL when compared to initial evaluation for improved participation in work related tasks  - Pt will consistently report decreased pain when compared to initial evaluation   - Pt will report feeling >75% back to normal for increased independence in daily routine and tasks  - Pt will express readiness for discharge with improved independence        Plan  Patient would benefit from: skilled occupational therapy  Planned modality interventions: thermotherapy: hydrocollator packs  Planned therapy interventions: neuromuscular re-education, stretching, strengthening, therapeutic activities, therapeutic exercise, patient education, graded exercise, graded activity, home exercise program, graded motor and functional ROM exercises  Frequency: 1x week  Duration in weeks: 12  Plan of Care beginning date: 2022  Plan of Care expiration date: 10/27/2022        Subjective Evaluation    History of Present Illness  Mechanism of injury: Pt presents to OT evaluation on 2022  Worsening symptoms in the R hand  Pain, numbness and tingling  Pain on ulnar side of the R hand from elbow into the pinky  More numbness reported through D2, D3, and D5  Wraps an ace bandage around the wrist when in a lot of pain  Pain is worse at work (cleaning, typing, lifting)  More pain when driving  L hand: pain in the wrist  History of fracture  About a year ago "heard a pop" and ever since then it has occasionally hurt  Has not seen an orthopedic doctor  Hand Dominant: R    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    Quality of life: good    Pain  Current pain ratin  At best pain ratin  At worst pain ratin  Relieving factors: ice    Hand dominance: right    Patient Goals  Patient goals for therapy: independence with ADLs/IADLs, return to sport/leisure activities, return to work, increased motion, decreased pain, decreased edema and increased strength          Objective     Tenderness     Additional Tenderness Details  Ulnar sided wrist and forearm L/R, R>L    Passive Range of Motion     Left Wrist   Wrist flexion: 68 degrees   Wrist extension: 92 degrees   Radial deviation: 15 degrees   Ulnar deviation: 40 degrees     Right Wrist   Wrist flexion: 68 degrees   Wrist extension: 86 degrees   Radial deviation: 15 degrees   Ulnar deviation: 45 degrees     Strength/Myotome Testing     Left Wrist/Hand      (2nd hand position)     Trial 1: 40    Thumb Strength  Key/Lateral Pinch     Trial 1: 15  Tip/Two-Point Pinch     Trial 1: 9  Palmar/Three-Point Pinch     Trial 1: 10    Right Wrist/Hand      (2nd hand position)     Trial 1: 32    Thumb Strength   Key/Lateral Pinch     Trial 1: 13  Tip/Two-Point Pinch     Trial 1: 9  Palmar/Three-Point Pinch     Trial 1: 10    Tests     Left Elbow   Negative Tinel's sign (cubital tunnel)  Right Elbow   Negative Tinel's sign (cubital tunnel)  Left Wrist/Hand   Negative Phalen's sign, TFCC load and Tinel's sign (medial nerve)  Right Wrist/Hand   Positive Tinel's sign (medial nerve)  Negative Phalen's sign and TFCC load  Swelling     Left Wrist/Hand   Circumference wrist: 15 5 cm    Right Wrist/Hand   Circumference wrist: 15 5 cm             Precautions: Universal    Manuals HEP                        Ther Ex  10 min education   Education on HEP and dx x5 min     AROM tendon glides x10    AROM table top extension x10    AROM digit add/abduction x10    AROM wrist flex/ext/RD/UD x10    AROM forearm rotation x10    PROM wrist flex/ext X3 10 sec    PROM ulnar/radial deviation                              Modalities     Heat 10-15 min 5 min          Therapist supervised patient with use of modalities during today's treatment session  Skin integrity was assessed and appeared normal following use of modalities  Assessment:   Pt presents to OT evaluation on 8/4/2022  Worsening symptoms in the R hand  Pain, numbness and tingling  Pain on ulnar side of the R hand from elbow into the pinky  More numbness reported through D2, D3, and D5  Wraps an ace bandage around the wrist when in a lot of pain  Pain is worse at work (cleaning, typing, lifting)  More pain when driving  Pt demonstrated mild soft tissue tightness through forearm flexors and extensors on R hand today  Slightly limited wrist extension on R when compared to L  No edema present  Decreased  strength on R when compared to L  Pt tests positive for Tinel's at wrist indicative of carpal tunnel, however, pt reports numbness in not consistent with first three fingers  Tenderness with palpation of ulnar sided wrist and forearm R>L   Pt was fabricated wrist widget today to trial on R  Educated pt on work modification techniques to reduce common overuse  Provided pt with information for [facility] orthopedic hand speciality  Pt will be seen for OT services 1-2x/wk for 12 weeks to improve ability to complete work-related tasks  POC was reviewed with the pt, pt understands and agrees with POC  Plan:   Focus on ROM and strengthening to improve ability to complete daily activites with ease    POC 8/4/2022-10/27/2022

## 2022-08-09 ENCOUNTER — OFFICE VISIT (OUTPATIENT)
Dept: OCCUPATIONAL THERAPY | Facility: MEDICAL CENTER | Age: 22
End: 2022-08-09
Payer: COMMERCIAL

## 2022-08-09 DIAGNOSIS — M25.532 BILATERAL WRIST PAIN: Primary | ICD-10-CM

## 2022-08-09 DIAGNOSIS — M25.531 BILATERAL WRIST PAIN: Primary | ICD-10-CM

## 2022-08-09 PROCEDURE — 97110 THERAPEUTIC EXERCISES: CPT

## 2022-08-09 PROCEDURE — 97140 MANUAL THERAPY 1/> REGIONS: CPT

## 2022-08-09 NOTE — PROGRESS NOTES
Daily Note     Today's date: 2022  Patient name: Alejandro Ivy  : 2000  MRN: [id number]  Referring provider: Del Falk MD  Dx:   Encounter Diagnosis     ICD-10-CM    1  Bilateral wrist pain  M25 531     M25 532                   Subjective: "I do not think the wrist widget is really helping me  I keep forgetting to make an appointment with ortho "    Objective: See treatment diary below       Precautions: Universal    Manuals HEP    Forearm extensors/flexors, ulnar side IASTM  Time: 10 min   K-tape  Yes             Ther Ex     Education on HEP and dx x5 min  X 10   AROM tendon glides x10 X 10   AROM table top extension x10 X 10   AROM digit add/abduction x10 X 10   AROM wrist flex/ext/RD/UD x10 X 10   AROM forearm rotation x10 X 10   PROM wrist flex/ext X3 10 sec X 10   PROM ulnar/radial deviation  X 10   Juxtaciser  X 10   Theraputty HEP  Time: 5 minutes   Median Nerve Glides  X 10             Modalities     Heat 10-15 min 5 min          Therapist supervised patient with use of modalities during today's treatment session  Skin integrity was assessed and appeared normal following use of modalities  Assessment:   Pt is continuing to benefit from OT treatment services  Introduced IASTM manual therapy for soft tissue tightness on forearm flexors/extensors and ulnar sided tendons  Tolerates AROM without pain  Reports numbness through D1-D3, educated on median nerve glides  Educated pt on gentle strengthening theraputty today for HEP to improve  strength  Will continue to progress pt as tolerated for improved participation/performance in work related tasks  Plan:   Focus on ROM and strengthening to improve ability to complete daily activites with ease    POC 2022-10/27/2022

## 2022-08-10 ENCOUNTER — TELEPHONE (OUTPATIENT)
Dept: OBGYN CLINIC | Facility: CLINIC | Age: 22
End: 2022-08-10

## 2022-08-16 ENCOUNTER — OFFICE VISIT (OUTPATIENT)
Dept: OCCUPATIONAL THERAPY | Facility: MEDICAL CENTER | Age: 22
End: 2022-08-16
Payer: COMMERCIAL

## 2022-08-16 DIAGNOSIS — M25.532 BILATERAL WRIST PAIN: Primary | ICD-10-CM

## 2022-08-16 DIAGNOSIS — M25.531 BILATERAL WRIST PAIN: Primary | ICD-10-CM

## 2022-08-16 PROCEDURE — 97110 THERAPEUTIC EXERCISES: CPT

## 2022-08-16 PROCEDURE — 97140 MANUAL THERAPY 1/> REGIONS: CPT

## 2022-08-16 NOTE — PROGRESS NOTES
Daily Note     Today's date: 2022  Patient name: Alejandro Ivy  : 2000  MRN: [id number]  Referring provider: Del Falk MD  Dx:   Encounter Diagnosis     ICD-10-CM    1  Bilateral wrist pain  M25 531     M25 532                   Subjective: "The K-tape really helped last time " "It was really bugging me after I vacuumed " "I have an appointment with ortho tomorrow "    Pain prior to treatment session: 3/10    Objective: See treatment diary below       Precautions: Universal    Manuals HEP    Forearm extensors/flexors, ulnar side IASTM  Time: 10 min   K-tape  Yes             Ther Ex     Education on HEP and dx x5 min  X 10   AROM tendon glides x10 X 10   AROM table top extension x10 X 10   AROM digit add/abduction x10 X 10   AROM wrist flex/ext/RD/UD x10 X 10   AROM forearm rotation x10 X 10   PROM wrist flex/ext X3 10 sec 3 x 20 second hold   PROM ulnar/radial deviation  3 x 20 second hold   Juxtaciser  X 10   Theraputty HEP  Time: 5 minutes   Median Nerve Glides  X 10   1st dorsal compartment stretch  3 x 20   [employer] #2 Wrist Flexion, Wrist Extension, Radial Deviation  X 15   [employer] #2 Supination, Pronation  X 15                  Modalities     Heat 10-15 min 5 min          Therapist supervised patient with use of modalities during today's treatment session  Skin integrity was assessed and appeared normal following use of modalities  Assessment:   Pt is continuing to benefit from OT treatment services  Introduced IASTM manual therapy for soft tissue tightness on forearm flexors/extensors and ulnar sided tendons  Progressed pt with [employer]entrics today, able to tolerate without pain  Added 1st dorsal compartment stretch to HEP due to reported pain through on radial side  Continued with K-taping  Will continue to progress pt as tolerated for improved participation/performance in work related tasks      Plan:   Focus on ROM and strengthening to improve ability to complete daily activites with ease    POC 8/4/2022-10/27/2022

## 2022-08-17 ENCOUNTER — OFFICE VISIT (OUTPATIENT)
Dept: OBGYN CLINIC | Facility: CLINIC | Age: 22
End: 2022-08-17
Payer: COMMERCIAL

## 2022-08-17 VITALS — WEIGHT: 157.8 LBS | BODY MASS INDEX: 26.94 KG/M2 | OXYGEN SATURATION: 100 % | HEIGHT: 64 IN | HEART RATE: 114 BPM

## 2022-08-17 DIAGNOSIS — G56.21 CUBITAL TUNNEL SYNDROME, RIGHT: ICD-10-CM

## 2022-08-17 DIAGNOSIS — G56.01 CARPAL TUNNEL SYNDROME ON RIGHT: Primary | ICD-10-CM

## 2022-08-17 PROCEDURE — 99203 OFFICE O/P NEW LOW 30 MIN: CPT | Performed by: PHYSICIAN ASSISTANT

## 2022-08-17 NOTE — PROGRESS NOTES
ASSESSMENT/PLAN:    Assessment:   Carpal Tunnel Syndrome  right and Cubital Tunnel Syndrome  right    Plan:   US MSK elbow and wrist, right    Follow Up: After US with Dr Lourdes Reno    To Do Next Visit:       General Discussions:     Carpal Tunnel Syndrome: The anatomy and physiology of carpal tunnel syndrome was discussed with the patient today  Increase pressure localized under the transverse carpal ligament can cause pain, numbness, tingling, or dysesthesias within the median nerve distribution as well as feelings of fatigue, clumsiness, or awkwardness  These symptoms typically occur at night and worse in the morning upon waking  Eventually, untreated carpal tunnel syndrome can result in weakness and permanent loss of muscle within the thenar compartment of the hand  Treatment options were discussed with the patient  Conservative treatment includes nocturnal resting splints to keep the nerve in a neutral position, ergonomic changes within the work or home environment, activity modification, and tendon gliding exercises  Steroid injections within the carpal canal can help a majority of patients, however this is often self-limited in a majority of patients  Surgical intervention to divide the transverse carpal ligament typically results in a long-lasting relief of the patient's complaints, with the recurrence rate of less than 1%  Cubital Tunnel Syndrome: The anatomy and physiology of cubital tunnel syndrome were discussed with the patient today in the office  Typically, increased elbow flexion activities decrease blood flow within the intraneural spaces, resulting in a feeling of numbness, tingling, weakness, or clumsiness within the hand and fingers  Occasionally, anatomic structures such as medial elbow osteophytes, the medial head of the triceps, were subluxing ulnar nerve may result in increased pressure or aggravation at the cubital tunnel    Typical signs and symptoms usually include numbness and tingling within the ring and small finger, weakness with , and weakness with pinch  Conservative treatment and includes nocturnal bracing to keep the elbow in a semi-extended position, activity modification, therapy, and avoiding excessive elbow flexion activities  A majority of patients typically respond to conservative treatment over a period of approximately 3-6 months  EMG/NCV testing of the ulnar nerve at the elbow is not as reliable as carpal tunnel syndrome  Surgical intervention in the form of in situ release of the ulnar nerve at the elbow or ulnar nerve transposition may be required in up to 20% of patients  Operative Discussions:          _____________________________________________________  CHIEF COMPLAINT:  Chief Complaint   Patient presents with    Right Wrist - Numbness, Tingling, Pain         SUBJECTIVE:  Lisa Martin is a 22 y o  female who presents with Pain  Mild  Intermittant  Dull to the right hand  This started  8 week(s) ago as Insidious  Radiation: Sometimes to the thumb / index / long, and other times to the ring / small  Previous Treatments: Therapy and KT tape without relief  Associated symptoms: Numbness  Handedness: right  Work status: cleans a [employer]    PAST MEDICAL HISTORY:  Past Medical History:   Diagnosis Date    Mononucleosis        PAST SURGICAL HISTORY:  History reviewed  No pertinent surgical history      FAMILY HISTORY:  Family History   Problem Relation Age of Onset    Graves' disease Mother     Hypertension Father        SOCIAL HISTORY:  Social History     Tobacco Use    Smoking status: Former Smoker     Types: Cigarettes    Smokeless tobacco: Never Used    Tobacco comment: quit 2-3 years   Vaping Use    Vaping Use: Every day    Substances: Nicotine, Flavoring   Substance Use Topics    Alcohol use: Not Currently     Comment: rarely    Drug use: Not Currently     Types: Marijuana     Comment: socially       MEDICATIONS:    Current Outpatient Medications:     norethindrone-ethinyl estradiol (Junel FE 1/20) 1-20 MG-MCG per tablet, Take 1 tablet by mouth daily, Disp: 84 tablet, Rfl: 3    ALLERGIES:  No Known Allergies    REVIEW OF SYSTEMS:  Pertinent items are noted in HPI  A comprehensive review of systems was negative  LABS:  HgA1c: No results found for: HGBA1C  BMP:   Lab Results   Component Value Date    CALCIUM 9 3 06/12/2022    K 3 5 06/12/2022    CO2 26 06/12/2022     06/12/2022    BUN 10 06/12/2022    CREATININE 0 80 06/12/2022         _____________________________________________________  PHYSICAL EXAMINATION:  Vital signs: Pulse (!) 114   Ht 5' 4" (1 626 m)   Wt 71 6 kg (157 lb 12 8 oz)   SpO2 100%   BMI 27 09 kg/m²   General: well developed and well nourished, alert, oriented times 3 and appears comfortable  Psychiatric: Normal  HEENT: Trachea Midline, No torticollis  Cardiovascular: No discernable arrhythmia  Pulmonary: No wheezing or stridor  Abdomen: No guarding  Extremities: No peripheral edema  Skin: No masses, erythema, lacerations, fluctation, ulcerations  Neurovascular:  Motor Intact to the Median, Ulnar, Radial Nerve and Pulses Intact    MUSCULOSKELETAL EXAMINATION:  RIGHT SIDE:  Carpal tunnel:  No weakness APB, No atrophy thenar muscles, Postive Tinel's and Positive Phalen's Test   Cubital Tunnel:  No atrophy, Negative clawing and + tinels  Negative spurling    _____________________________________________________  STUDIES REVIEWED:  No Studies to review      PROCEDURES PERFORMED:  Procedures  No Procedures performed today

## 2022-08-23 ENCOUNTER — OFFICE VISIT (OUTPATIENT)
Dept: OCCUPATIONAL THERAPY | Facility: MEDICAL CENTER | Age: 22
End: 2022-08-23
Payer: COMMERCIAL

## 2022-08-23 DIAGNOSIS — M25.532 BILATERAL WRIST PAIN: Primary | ICD-10-CM

## 2022-08-23 DIAGNOSIS — M25.531 BILATERAL WRIST PAIN: Primary | ICD-10-CM

## 2022-08-23 PROCEDURE — 97140 MANUAL THERAPY 1/> REGIONS: CPT

## 2022-08-23 PROCEDURE — 97110 THERAPEUTIC EXERCISES: CPT

## 2022-08-23 NOTE — PROGRESS NOTES
Daily Note     Today's date: 2022  Patient name: Ashley Carias  : 2000  MRN: [id number]  Referring provider: René Craig MD  Dx:   Encounter Diagnosis     ICD-10-CM    1  Bilateral wrist pain  M25 531     M25 532                   Subjective: "I am having a lot of back pain " "I saw the orthopedic they are saying I have carpal tunnel and cubital tunnel  I have an appointment with another orthopedic on  "    Pain prior to treatment session: 0/10    Objective: See treatment diary below       Precautions: Universal    Manuals HEP    Forearm extensors/flexors, ulnar side IASTM  Time: 10 min   K-tape               Ther Ex     Education on HEP and dx x5 min  X 10   AROM tendon glides x10 X 10   AROM table top extension x10 X 10   AROM digit add/abduction x10 X 10   AROM wrist flex/ext/RD/UD x10 X 10   AROM forearm rotation x10 X 10   PROM wrist flex/ext X3 10 sec 3 x 20 second hold   PROM ulnar/radial deviation  3 x 20 second hold   Juxtaciser  X 10   Theraputty HEP  Time: 5 minutes   Median Nerve Glides  X 10   1st dorsal compartment stretch  3 x 20   PREs #2 Wrist Flexion, Wrist Extension, Radial Deviation  2 x 10   PREs #2 Supination, Pronation  2 x 10   Sustained Grasp Gripper Level #3 with #1 weight  3 x 20 sec hold   Theraputty Yellow with Juxtaciser Tools and weight bearing  Time: 3 minutes        Modalities     Heat 10-15 min 5 min          Therapist supervised patient with use of modalities during today's treatment session  Skin integrity was assessed and appeared normal following use of modalities  Assessment:   Pt is continuing to benefit from OT treatment services  Pt reported 0/10 pain in wrist today  Reports notable improvement with symptoms at work  Able to progress pt from eccentrics to PRE's today  Able to complete sustained grasp and light weight bearing without pain at ulnar side   Will continue to progress pt as tolerated for improved participation/performance in work related tasks  Plan:   Focus on ROM and strengthening to improve ability to complete daily activites with ease    POC 8/4/2022-10/27/2022

## 2022-08-29 ENCOUNTER — HOSPITAL ENCOUNTER (OUTPATIENT)
Dept: RADIOLOGY | Facility: HOSPITAL | Age: 22
Discharge: HOME | End: 2022-08-29
Payer: COMMERCIAL

## 2022-08-29 DIAGNOSIS — G56.21 CUBITAL TUNNEL SYNDROME, RIGHT: ICD-10-CM

## 2022-08-29 DIAGNOSIS — G56.01 CARPAL TUNNEL SYNDROME ON RIGHT: ICD-10-CM

## 2022-08-29 PROCEDURE — 76882 US LMTD JT/FCL EVL NVASC XTR: CPT

## 2022-08-30 ENCOUNTER — OFFICE VISIT (OUTPATIENT)
Dept: OCCUPATIONAL THERAPY | Facility: MEDICAL CENTER | Age: 22
End: 2022-08-30
Payer: COMMERCIAL

## 2022-08-30 DIAGNOSIS — M25.531 BILATERAL WRIST PAIN: Primary | ICD-10-CM

## 2022-08-30 DIAGNOSIS — M25.532 BILATERAL WRIST PAIN: Primary | ICD-10-CM

## 2022-08-30 PROCEDURE — 97110 THERAPEUTIC EXERCISES: CPT

## 2022-08-30 PROCEDURE — 97140 MANUAL THERAPY 1/> REGIONS: CPT

## 2022-08-30 NOTE — PROGRESS NOTES
Daily Note     Today's date: 2022  Patient name: Donna Saenz  : 2000  MRN: [id number]  Referring provider: Bard Talia MD  Dx:   No diagnosis found  Subjective: "I had my ultrasound done yesterday  They are saying I do not have cubital or carpal tunnel "    Pain prior to treatment session: 0/10    Objective: See treatment diary below       Precautions: Universal    Manuals HEP    Forearm extensors/flexors, ulnar side IASTM  Time: 10 min   K-tape               Ther Ex     Education on HEP and dx x5 min  X 10   AROM tendon glides x10 X 10   AROM table top extension x10 X 10   AROM digit add/abduction x10 X 10   AROM wrist flex/ext/RD/UD x10 X 10   AROM forearm rotation x10 X 10   PROM wrist flex/ext X3 10 sec 3 x 20 second hold   PROM ulnar/radial deviation  3 x 20 second hold   Juxtaciser  X 10   Theraputty HEP  Time: 5 minutes   Median Nerve Glides  X 10   1st dorsal compartment stretch  3 x 20   PREs #3 Wrist Flexion, Wrist Extension, Radial Deviation  2 x 10   PREs #3 Supination, Pronation  2 x 10   Sustained Grasp Gripper Level #3 with #1 1/2 weight  3 x 20 sec hold   Theraputty Yellow with Juxtaciser Tools and weight bearing  Time: 3 minutes        Modalities     Heat 10-15 min 5 min          Therapist supervised patient with use of modalities during today's treatment session  Skin integrity was assessed and appeared normal following use of modalities  Assessment:   Pt is continuing to benefit from OT treatment services  Pt reported 0/10 pain in wrist today  Reports notable improvement with symptoms at work  Continued progression with therapeutic exercise from #3 to #4 weight  Some difficulty noted with sustained grasp, holding for 20 seconds  Will continue to progress pt as tolerated for improved participation/performance in work related tasks  May consider for discharge next session      Plan:   Focus on ROM and strengthening to improve ability to complete daily activites with ease    POC 8/4/2022-10/27/2022

## 2022-09-09 ENCOUNTER — OFFICE VISIT (OUTPATIENT)
Dept: OCCUPATIONAL THERAPY | Facility: MEDICAL CENTER | Age: 22
End: 2022-09-09
Payer: COMMERCIAL

## 2022-09-09 DIAGNOSIS — M25.532 BILATERAL WRIST PAIN: Primary | ICD-10-CM

## 2022-09-09 DIAGNOSIS — M25.531 BILATERAL WRIST PAIN: Primary | ICD-10-CM

## 2022-09-09 PROCEDURE — 97140 MANUAL THERAPY 1/> REGIONS: CPT

## 2022-09-09 PROCEDURE — 97110 THERAPEUTIC EXERCISES: CPT

## 2022-09-09 NOTE — PROGRESS NOTES
Daily Note     Today's date: 2022  Patient name: Quenten Schwab  : 2000  MRN: [id number]  Referring provider: Kunal Moyer MD  Dx:   Encounter Diagnosis     ICD-10-CM    1  Bilateral wrist pain  M25 531     M25 532                   Subjective: "The right side is much better  I just have been having trouble with the left side now "    Pain prior to treatment session: 0/10    Objective: See treatment diary below       Precautions: Universal    Manuals HEP    Forearm extensors/flexors, ulnar side IASTM  Time: 10 min   K-tape               Ther Ex     Education on HEP and dx x5 min  X 10   AROM tendon glides x10 X 10   AROM table top extension x10 X 10   AROM digit add/abduction x10 X 10   AROM wrist flex/ext/RD/UD x10 X 10   AROM forearm rotation x10 X 10   PROM wrist flex/ext X3 10 sec 3 x 20 second hold   PROM ulnar/radial deviation  3 x 20 second hold   Juxtaciser  X 10   Theraputty HEP  Time: 5 minutes   Median Nerve Glides  X 10   1st dorsal compartment stretch  3 x 20   PREs #4 Wrist Flexion, Wrist Extension, Radial Deviation  2 x 10   PREs #3 Supination, Pronation  2 x 10   Sustained Grasp Gripper Level #3 with #1 1/2 weight     Theraputty Yellow with Juxtaciser Tools and weight bearing  Time: 3 minutes        Modalities     Heat 10-15 min 5 min          Therapist supervised patient with use of modalities during today's treatment session  Skin integrity was assessed and appeared normal following use of modalities  Assessment:   Pt is continuing to benefit from OT treatment services  Pt reported 0/10 pain upon treatment session today  States that is able to sustain most work related tasks without provocation  Progressed to #4 with wrist strengthening today  Pt will be placed on hold at this time  Plan:   Focus on ROM and strengthening to improve ability to complete daily activites with ease    POC 2022-10/27/2022

## 2022-09-13 ENCOUNTER — OFFICE VISIT (OUTPATIENT)
Dept: OBGYN CLINIC | Facility: MEDICAL CENTER | Age: 22
End: 2022-09-13
Payer: COMMERCIAL

## 2022-09-13 VITALS
DIASTOLIC BLOOD PRESSURE: 76 MMHG | SYSTOLIC BLOOD PRESSURE: 122 MMHG | HEIGHT: 63 IN | BODY MASS INDEX: 28.53 KG/M2 | HEART RATE: 73 BPM | WEIGHT: 161 LBS | RESPIRATION RATE: 16 BRPM

## 2022-09-13 DIAGNOSIS — G56.01 CARPAL TUNNEL SYNDROME ON RIGHT: Primary | ICD-10-CM

## 2022-09-13 DIAGNOSIS — G56.21 CUBITAL TUNNEL SYNDROME, RIGHT: ICD-10-CM

## 2022-09-13 PROCEDURE — 99214 OFFICE O/P EST MOD 30 MIN: CPT | Performed by: ORTHOPAEDIC SURGERY

## 2022-09-13 NOTE — PROGRESS NOTES
[place] [facility]DEN Dejesus [address] [place] [id number]       Cesar Casey  [id number]  2000    ORTHOPAEDIC SURGERY OUTPATIENT NOTE  9/13/2022      HISTORY:  22 y o  female who presents to the office today for evaluation and treatment of bilateral hand paresthesias, R>L  Patient states she feels this in all the fingers on the R, only thumb/index/long on the L  Symptoms have been present for approx 2 5 months  No trauma  Pt works at [employer] but states she does not have to perform heavy lifting with this  She is on the computer a lot  She did see acute orthopedic care who ordered u/s of the right elbow and wrist and presents today to go over results      Past Medical History:   Diagnosis Date    Mononucleosis        Past Surgical History:   Procedure Laterality Date    NO PAST SURGERIES         Social History     Socioeconomic History    Marital status: Single     Spouse name: Not on file    Number of children: Not on file    Years of education: Not on file    Highest education level: Not on file   Occupational History    Not on file   Tobacco Use    Smoking status: Former Smoker     Types: Cigarettes    Smokeless tobacco: Never Used    Tobacco comment: quit 2-3 years   Vaping Use    Vaping Use: Every day    Substances: Nicotine, Flavoring   Substance and Sexual Activity    Alcohol use: Not Currently     Comment: rarely    Drug use: Not Currently     Types: Marijuana     Comment: socially    Sexual activity: Yes     Partners: Male     Birth control/protection: OCP   Other Topics Concern    Not on file   Social History Narrative    Not on file     Social Determinants of Health     Financial Resource Strain: Not on file   Food Insecurity: Not on file   Transportation Needs: Not on file   Physical Activity: Not on file   Stress: Not on file   Social Connections: Not on file   Intimate Partner Violence: Not on file   Housing Stability: Not on file       Family History   Problem Relation Age of Onset   Giselle Shires' disease Mother     Hypertension Father         Patient's Medications   New Prescriptions    DICLOFENAC SODIUM (VOLTAREN) 1 %    Apply 2 g topically 4 (four) times a day   Previous Medications    NORETHINDRONE-ETHINYL ESTRADIOL (JUNEL FE 1/20) 1-20 MG-MCG PER TABLET    Take 1 tablet by mouth daily   Modified Medications    No medications on file   Discontinued Medications    No medications on file       No Known Allergies     REVIEW OF SYSTEMS:  Constitutional: Negative  HEENT: Negative  Respiratory: Negative  Skin: Negative  Neurological: Negative  Psychiatric/Behavioral: Negative  Musculoskeletal: Negative except for that mentioned in the HPI  PHYSICAL EXAM:    /76 (BP Location: Left arm, Patient Position: Sitting)   Pulse 73   Resp 16   Ht 5' 3" (1 6 m)   Wt 73 kg (161 lb)   BMI 28 52 kg/m²   Gen: No acute distress, resting comfortably in bed  HEENT: Eyes clear, moist mucus membranes, hearing intact  Respiratory: No audible wheezing or stridor  Cardiovascular: Well Perfused peripherally, 2+ distal pulse  Abdomen: nondistended, no peritoneal signs    MUSCULOSKELETAL EXAM:  Left Upper Extremity:  No thenar atrophy  No hypothenar/1st dorsal interosseous atrophy  + Tinel's carpal tunnel  - Tinel's cubital tunnel  + mild Durkan's  + Phalen's  SILT m/r/u nerve distributions  Intrinsics 5/5  APB 5/5  Brisk capillary refill  Right Upper Extremity:  No thenar atrophy  No hypothenar/1st dorsal interosseous atrophy  + Tinel's carpal tunnel  + Tinel's cubital tunnel  + Durkan's  + Phalen's  SILT m/r/u nerve distributions  Intrinsics 5/5  APB 4+/5  Brisk capillary refill  IMAGING:  Images were reviewed in PACS by myself and ultrasounds demonstrate no sonographic findings of cubital or carpal tunnels       ASSESSMENT AND PLAN:  22 y o  female likely with positional bilateral median and right ulnar neuritis  We discussed pt's u/s findings with her today  However, her physical exam is significant for both of these diagnoses  Since she is young and has had no trauma, this likely is positional  We discussed use of bilateral cock up wrist splints and pillow/towel splint for elbow extension at night  In addition, we discussed limiting keep the elbow/wrist flexed or leaning on this area throughout the day  Patient also may try Voltaren Gel to see if this provides any relief  We will have her follow up in 3 months if still symptomatic  If symptoms worsen, call sooner       PROCEDURES PERFORMED:  Procedures  No Procedures performed today    Scribe Attestation    I,:  Saleem Timmons PA-C am acting as a scribe while in the presence of the attending physician :       I,:  Kd Ambrosio personally performed the services described in this documentation    as scribed in my presence :

## 2022-09-21 ENCOUNTER — TELEPHONE (OUTPATIENT)
Dept: INFECTIOUS DISEASES | Facility: CLINIC | Age: 22
End: 2022-09-21

## 2022-09-21 NOTE — TELEPHONE ENCOUNTER
Patient has been seen in our office for EBV May of 2022  Patient states she was instructed to call to get an appointment if she has further issues, or PCP refers her back  Per patient, she is having the following issues:    -Headache, Fatigues, Spleen pain (she feels it's her ribs, not her spleen)  No fevers  Patient is requesting to see us in the office   Patient will have Dr Iftikhar Bustillo office fax over notes and referral     [facility]: [phone]

## 2022-09-22 NOTE — TELEPHONE ENCOUNTER
Contacted pediatrics ([facility]) to see about this patient  They sent patient for imaging (CXR normal), to ortho, to gastro, rheum will not see her until Feb/March 2023  Patient states her rib is "popped out" pushing out her skin  She sees them in person Monday 9/26  They did not physically see that yet  On Monday, they state they will see her and go from there   If indicated, they will send us their records and a referral

## 2022-12-05 DIAGNOSIS — Z30.41 ENCOUNTER FOR SURVEILLANCE OF CONTRACEPTIVE PILLS: Primary | ICD-10-CM

## 2022-12-05 RX ORDER — NORETHINDRONE ACETATE AND ETHINYL ESTRADIOL 1MG-20(21)
1 KIT ORAL DAILY
Status: CANCELLED | OUTPATIENT
Start: 2022-12-05

## 2022-12-05 NOTE — TELEPHONE ENCOUNTER
Needs refill,   norethindrone-ethinyl estradiol (Junel FE 1/20) 1-20 MG-MCG per tablet [[id number]]     Order Details  Dose: 1 tablet Route: Oral Frequency: Daily   Dispense Quantity: 84 tablet Refills: 3          Sig: Take 1 tablet by mouth daily

## 2022-12-28 ENCOUNTER — ANNUAL EXAM (OUTPATIENT)
Dept: OBGYN CLINIC | Facility: CLINIC | Age: 22
End: 2022-12-28

## 2022-12-28 VITALS
DIASTOLIC BLOOD PRESSURE: 88 MMHG | SYSTOLIC BLOOD PRESSURE: 120 MMHG | HEIGHT: 63 IN | WEIGHT: 165.2 LBS | BODY MASS INDEX: 29.27 KG/M2

## 2022-12-28 DIAGNOSIS — Z11.3 ROUTINE SCREENING FOR STI (SEXUALLY TRANSMITTED INFECTION): ICD-10-CM

## 2022-12-28 DIAGNOSIS — Z30.011 ENCOUNTER FOR INITIAL PRESCRIPTION OF CONTRACEPTIVE PILLS: ICD-10-CM

## 2022-12-28 DIAGNOSIS — Z01.419 ENCOUNTER FOR GYNECOLOGICAL EXAMINATION WITHOUT ABNORMAL FINDING: Primary | ICD-10-CM

## 2022-12-28 RX ORDER — NORETHINDRONE ACETATE AND ETHINYL ESTRADIOL 1MG-20(21)
1 KIT ORAL DAILY
Qty: 84 TABLET | Refills: 3 | Status: SHIPPED | OUTPATIENT
Start: 2022-12-28

## 2022-12-28 NOTE — PROGRESS NOTES
Subjective      Chiquis Marinelli is a 25 y o  female who presents for annual well woman exam  Periods are regular every 28-30 days, lasting 3 days  No intermenstrual bleeding, spotting, or discharge  Current contraception: OCP (estrogen/progesterone)    Family history of uterine or ovarian cancer: no    Family history of breast cancer: no    Menstrual History:  OB History        0    Para   0    Term   0       0    AB   0    Living   0       SAB   0    IAB   0    Ectopic   0    Multiple   0    Live Births   0                  Patient's last menstrual period was 2022 (approximate)  Period Cycle (Days): 28  Period Duration (Days): 5-6  Period Pattern: Regular  Menstrual Flow: Moderate  Dysmenorrhea: (!) Mild  Dysmenorrhea Symptoms: Cramping    The following portions of the patient's history were reviewed and updated as appropriate: allergies, current medications, past family history, past medical history, past social history, past surgical history and problem list     Review of Systems  Review of Systems   Constitutional: Negative for appetite change, chills, fatigue and fever  Respiratory: Negative for apnea, cough, chest tightness and shortness of breath  Cardiovascular: Negative for chest pain, palpitations and leg swelling  Gastrointestinal: Negative for abdominal pain, constipation, diarrhea, nausea and vomiting  Genitourinary: Negative for difficulty urinating, dysuria, flank pain, frequency, hematuria, urgency, vaginal bleeding, vaginal discharge and vaginal pain  Neurological: Negative for seizures, syncope, light-headedness, numbness and headaches  Psychiatric/Behavioral: Negative for agitation and confusion            Objective      /88 (BP Location: Left arm, Patient Position: Sitting, Cuff Size: Adult)   Ht 5' 3" (1 6 m)   Wt 74 9 kg (165 lb 3 2 oz)   LMP 2022 (Approximate)   BMI 29 26 kg/m²     Physical Exam  OBGyn Exam     General:   alert and oriented, in no acute distress, alert, appears stated age and cooperative   Heart: regular rate and rhythm, S1, S2 normal, no murmur, click, rub or gallop   Lungs: clear to auscultation bilaterally   Abdomen: soft, non-tender, without masses or organomegaly   Vulva: normal   Vagina: normal mucosa, normal discharge   Cervix: no cervical motion tenderness and no lesions   Uterus: normal size   Adnexa:  Breast Exam:  normal adnexa  breasts appear normal, no suspicious masses, no skin or nipple changes or axillary nodes  Assessment      @well woman@   41-year-old female   annual exam  Dysmenorrhea respond to OCP   Smoker cessation recommended    Plan   Pap/reflux  GC/CT  Diet/exercise  Calcium/vitamin D  Return to office for annual exam     All questions answered  There are no Patient Instructions on file for this visit

## 2022-12-29 LAB
C TRACH DNA SPEC QL NAA+PROBE: NEGATIVE
N GONORRHOEA DNA SPEC QL NAA+PROBE: NEGATIVE

## 2023-01-03 LAB
LAB AP GYN PRIMARY INTERPRETATION: NORMAL
Lab: NORMAL

## 2023-03-26 ENCOUNTER — OFFICE VISIT (OUTPATIENT)
Dept: URGENT CARE | Facility: MEDICAL CENTER | Age: 23
End: 2023-03-26

## 2023-03-26 VITALS
WEIGHT: 165 LBS | BODY MASS INDEX: 29.23 KG/M2 | TEMPERATURE: 98 F | HEIGHT: 63 IN | HEART RATE: 80 BPM | DIASTOLIC BLOOD PRESSURE: 72 MMHG | SYSTOLIC BLOOD PRESSURE: 110 MMHG | OXYGEN SATURATION: 99 % | RESPIRATION RATE: 18 BRPM

## 2023-03-26 DIAGNOSIS — J02.0 STREP THROAT: Primary | ICD-10-CM

## 2023-03-26 LAB — S PYO AG THROAT QL: POSITIVE

## 2023-03-26 RX ORDER — AMOXICILLIN 500 MG/1
500 TABLET, FILM COATED ORAL 3 TIMES DAILY
Qty: 30 TABLET | Refills: 0 | Status: SHIPPED | OUTPATIENT
Start: 2023-03-26 | End: 2023-04-05

## 2023-03-26 NOTE — PROGRESS NOTES
[address] [facility]        NAME: Leoncio Bains is a 25 y o  female  : 2000    MRN: [id number]  DATE: 2023  TIME: 11:21 AM    Assessment and Plan   Strep throat [J02 0]  1  Strep throat  POCT rapid strepA    amoxicillin (AMOXIL) 500 MG tablet            Patient Instructions     1  Increase fluids  2  Motrin as needed for throat pain  3  Take Amox 500mg  3x daily x 10 days  4  Follow up with PCP in 3-5 days if symptoms persist       Chief Complaint     Chief Complaint   Patient presents with   • Sore Throat     Sore throat; hx of recurring strep          History of Present Illness       Radha Guardian is a 78-year-old female who presents with a 2-day history of acute onset sore throat  Patient reports no fever, nasal discharge cough or congestion  She reports no headache vomiting or diarrhea since the onset of illness  Sore Throat         Review of Systems   Review of Systems   Constitutional: Negative  HENT: Positive for sore throat  Respiratory: Negative  Gastrointestinal: Negative            Current Medications       Current Outpatient Medications:   •  amoxicillin (AMOXIL) 500 MG tablet, Take 1 tablet (500 mg total) by mouth 3 (three) times a day for 10 days, Disp: 30 tablet, Rfl: 0  •  Diclofenac Sodium (VOLTAREN) 1 %, Apply 2 g topically 4 (four) times a day (Patient not taking: Reported on 2022), Disp: 100 g, Rfl: 1  •  norethindrone-ethinyl estradiol (Junel FE 1/20) 1-20 MG-MCG per tablet, Take 1 tablet by mouth daily, Disp: 84 tablet, Rfl: 3    Current Allergies     Allergies as of 2023   • (No Known Allergies)            The following portions of the patient's history were reviewed and updated as appropriate: allergies, current medications, past family history, past medical history, past social history, past surgical history and problem list      Past Medical History:   Diagnosis Date   • Mononucleosis        Past Surgical History:   Procedure Laterality Date   • NO PAST "SURGERIES         Family History   Problem Relation Age of Onset   • Graves' disease Mother    • Thyroid disease Mother         Homa Bugler disease   • Hypertension Father    • Diabetes Paternal Grandmother          Medications have been verified  Objective   /72   Pulse 80   Temp 98 °F (36 7 °C)   Resp 18   Ht 5' 3\" (1 6 m)   Wt 74 8 kg (165 lb)   SpO2 99%   BMI 29 23 kg/m²   No LMP recorded  (Menstrual status: Birth Control)  Physical Exam     Physical Exam  Constitutional:       General: She is not in acute distress  Appearance: She is well-developed  She is not ill-appearing  HENT:      Head: Normocephalic and atraumatic  Right Ear: Tympanic membrane and ear canal normal       Left Ear: Tympanic membrane and ear canal normal       Nose: Nose normal       Mouth/Throat:      Pharynx: Posterior oropharyngeal erythema present  No oropharyngeal exudate  Cardiovascular:      Rate and Rhythm: Normal rate and regular rhythm  Heart sounds: Normal heart sounds, S1 normal and S2 normal  Heart sounds not distant  Pulmonary:      Effort: Pulmonary effort is normal       Breath sounds: Normal breath sounds and air entry  Neurological:      Mental Status: She is alert                     "

## 2023-11-07 NOTE — PATIENT INSTRUCTIONS
Cubital Tunnel Syndrome    DESCRIPTION  Cubital Tunnel Syndrome is a condition that involves pressure or stretching of the ulnar nerve (also known as the funny bone nerve), which can cause numbness or tingling in the ring and small fingers, pain in the forearm, and/or weakness in the hand  The ulnar nerve (Figure 1) runs in a groove on the inner side of the elbow  CAUSES  There are a few causes of this ulnar nerve problem  These include:    Pressure: The nerve has little padding over it  Direct pressure (like leaning the arm on an arm rest) can press the nerve, causing the arm and hand -- especially the ring and small fingers -- to fall asleep      Stretching:  Keeping the elbow bent for a long time can stretch the nerve behind the elbow  This can happen during sleep  Anatomy:  Sometimes, the ulnar nerve does not stay in its place and snaps back and forth over a bony bump as the elbow is moved  Repeated snapping can irritate the nerve  Sometimes, the soft tissues over the nerve become thicker or there is an extra muscle over the nerve that can keep it from working correctly  SIGNS AND SYMPTOMS  Cubital tunnel syndrome can cause pain, loss of sensation, tingling and/or weakness  Pins and needles usually are felt in the ring and small fingers  These symptoms are often felt when the elbow is bent for a long period of time, such as while holding a phone or while sleeping  Some people feel weak or clumsy  DIAGNOSIS   Your doctor can learn much by asking you about your symptoms and examining you  S/he might test you for other medical problems like diabetes or thyroid disease  Sometimes, nerve testing (EMG/NCS) may be needed to see how much the nerve and muscle are being affected  This test also checks for other problems such as a pinched nerve in the neck, which can cause similar symptoms  TREATMENT  The first treatment is to avoid actions that cause symptoms    Wrapping a pillow or towel Walk in :   Patient has been reschedule 11/28  paperwork edit and sent to [facility]   instructions modify to reflect change  instructions have been given to patient          loosely around the elbow (see below) or wearing a splint at night to keep the elbow from bending can help  Avoiding leaning on the funny bone can also help  A hand therapist can help you find ways to avoid pressure on the nerve  Sometimes, surgery may be needed to relieve the pressure on the nerve  This can involve releasing the nerve, moving the nerve to the front of the elbow, and/or removing a part of the bone  Your surgeon will talk to you about options  Therapy is sometimes needed after surgery, and the time it takes to recover can vary  Numbness and tingling may improve quickly or slowly  It may take many months for recovery after surgery  Cubital tunnel symptoms may not totally go away after surgery, especially if symptoms are severe